# Patient Record
Sex: FEMALE | Race: WHITE | Employment: OTHER | URBAN - METROPOLITAN AREA
[De-identification: names, ages, dates, MRNs, and addresses within clinical notes are randomized per-mention and may not be internally consistent; named-entity substitution may affect disease eponyms.]

---

## 2017-09-08 ENCOUNTER — HOSPITAL ENCOUNTER (OUTPATIENT)
Dept: MAMMOGRAPHY | Age: 67
Discharge: HOME | End: 2017-09-08

## 2017-09-08 DIAGNOSIS — Z12.31 ENCOUNTER FOR SCREENING MAMMOGRAM FOR BREAST CANCER: ICD-10-CM

## 2017-09-08 DIAGNOSIS — Z13.820 ENCOUNTER FOR IMAGING TO ASSESS OSTEOPOROSIS: ICD-10-CM

## 2019-09-04 ENCOUNTER — HOSPITAL ENCOUNTER (OUTPATIENT)
Dept: ULTRASOUND IMAGING | Age: 69
Discharge: HOME | End: 2019-09-04
Payer: MEDICARE

## 2019-09-04 DIAGNOSIS — K74.60 CIRRHOSIS OF LIVER WITHOUT ASCITES, UNSPECIFIED HEPATIC CIRRHOSIS TYPE (HCC): ICD-10-CM

## 2019-09-04 PROCEDURE — 76705 ECHO EXAM OF ABDOMEN: CPT

## 2019-09-17 ENCOUNTER — HOSPITAL ENCOUNTER (OUTPATIENT)
Dept: CT IMAGING | Age: 69
Discharge: HOME | End: 2019-09-17
Payer: MEDICARE

## 2019-09-17 ENCOUNTER — HOSPITAL ENCOUNTER (OUTPATIENT)
Age: 69
Discharge: HOME | End: 2019-09-17
Payer: MEDICARE

## 2019-09-17 DIAGNOSIS — R93.3 ABNORMAL FINDINGS ON RADIOLOGICAL EXAMINATION OF GASTROINTESTINAL TRACT: ICD-10-CM

## 2019-09-17 LAB
CREAT SERPL-MCNC: 0.7 MG/DL (ref 0.6–1.2)
GFR AFRICAN AMERICAN: >60
GFR NON-AFRICAN AMERICAN: >60

## 2019-09-17 PROCEDURE — 74170 CT ABD WO CNTRST FLWD CNTRST: CPT

## 2019-09-17 PROCEDURE — 36415 COLL VENOUS BLD VENIPUNCTURE: CPT

## 2019-09-17 PROCEDURE — 6360000004 HC RX CONTRAST MEDICATION: Performed by: INTERNAL MEDICINE

## 2019-09-17 PROCEDURE — 82565 ASSAY OF CREATININE: CPT

## 2019-09-17 RX ADMIN — IOPAMIDOL 75 ML: 755 INJECTION, SOLUTION INTRAVENOUS at 15:24

## 2022-05-02 ENCOUNTER — HOSPITAL ENCOUNTER (OUTPATIENT)
Dept: ULTRASOUND IMAGING | Age: 72
Discharge: HOME | End: 2022-05-02
Payer: MEDICARE

## 2022-05-02 DIAGNOSIS — K74.60 HEPATIC CIRRHOSIS, UNSPECIFIED HEPATIC CIRRHOSIS TYPE, UNSPECIFIED WHETHER ASCITES PRESENT (HCC): ICD-10-CM

## 2022-05-02 PROCEDURE — 76705 ECHO EXAM OF ABDOMEN: CPT

## 2022-11-28 ENCOUNTER — HOSPITAL ENCOUNTER (OUTPATIENT)
Dept: ULTRASOUND IMAGING | Age: 72
Discharge: HOME | End: 2022-11-28
Payer: MEDICARE

## 2022-11-28 DIAGNOSIS — K74.60 HEPATIC CIRRHOSIS, UNSPECIFIED HEPATIC CIRRHOSIS TYPE, UNSPECIFIED WHETHER ASCITES PRESENT (HCC): ICD-10-CM

## 2022-11-28 PROCEDURE — 76705 ECHO EXAM OF ABDOMEN: CPT

## 2023-05-15 ENCOUNTER — TRANSCRIBE ORDERS (OUTPATIENT)
Dept: ADMINISTRATIVE | Age: 73
End: 2023-05-15

## 2023-05-15 DIAGNOSIS — K74.69 OTHER CIRRHOSIS OF LIVER (HCC): Primary | ICD-10-CM

## 2023-05-22 ENCOUNTER — HOSPITAL ENCOUNTER (OUTPATIENT)
Dept: ULTRASOUND IMAGING | Age: 73
Discharge: HOME | End: 2023-05-22
Payer: MEDICARE

## 2023-05-22 DIAGNOSIS — K74.69 OTHER CIRRHOSIS OF LIVER (HCC): ICD-10-CM

## 2023-05-22 PROCEDURE — 76705 ECHO EXAM OF ABDOMEN: CPT

## 2023-12-04 ENCOUNTER — HOSPITAL ENCOUNTER (OUTPATIENT)
Dept: ULTRASOUND IMAGING | Age: 73
Discharge: HOME | End: 2023-12-04
Attending: INTERNAL MEDICINE
Payer: MEDICARE

## 2023-12-04 DIAGNOSIS — K74.60 HEPATIC CIRRHOSIS, UNSPECIFIED HEPATIC CIRRHOSIS TYPE, UNSPECIFIED WHETHER ASCITES PRESENT (HCC): ICD-10-CM

## 2023-12-04 PROCEDURE — 76705 ECHO EXAM OF ABDOMEN: CPT

## 2024-01-03 ENCOUNTER — HOSPITAL ENCOUNTER (OUTPATIENT)
Dept: MRI IMAGING | Age: 74
Discharge: HOME | End: 2024-01-03
Attending: INTERNAL MEDICINE
Payer: MEDICARE

## 2024-01-03 DIAGNOSIS — K74.69 OTHER CIRRHOSIS OF LIVER (HCC): ICD-10-CM

## 2024-01-03 PROCEDURE — 74183 MRI ABD W/O CNTR FLWD CNTR: CPT

## 2024-01-03 PROCEDURE — A9579 GAD-BASE MR CONTRAST NOS,1ML: HCPCS | Performed by: INTERNAL MEDICINE

## 2024-01-03 PROCEDURE — 6360000004 HC RX CONTRAST MEDICATION: Performed by: INTERNAL MEDICINE

## 2024-01-03 RX ADMIN — GADOTERIDOL 15 ML: 279.3 INJECTION, SOLUTION INTRAVENOUS at 11:32

## 2025-05-28 ENCOUNTER — APPOINTMENT (OUTPATIENT)
Dept: CT IMAGING | Age: 75
DRG: 871 | End: 2025-05-28
Attending: STUDENT IN AN ORGANIZED HEALTH CARE EDUCATION/TRAINING PROGRAM
Payer: MEDICARE

## 2025-05-28 ENCOUNTER — APPOINTMENT (OUTPATIENT)
Dept: GENERAL RADIOLOGY | Age: 75
DRG: 871 | End: 2025-05-28
Payer: MEDICARE

## 2025-05-28 ENCOUNTER — APPOINTMENT (OUTPATIENT)
Dept: CT IMAGING | Age: 75
DRG: 871 | End: 2025-05-28
Payer: MEDICARE

## 2025-05-28 ENCOUNTER — HOSPITAL ENCOUNTER (INPATIENT)
Age: 75
LOS: 7 days | Discharge: NURSING, REHAB OR LONG-TERM CARE | DRG: 871 | End: 2025-06-04
Attending: STUDENT IN AN ORGANIZED HEALTH CARE EDUCATION/TRAINING PROGRAM
Payer: MEDICARE

## 2025-05-28 DIAGNOSIS — A41.9 SEPTICEMIA (HCC): Primary | ICD-10-CM

## 2025-05-28 DIAGNOSIS — K76.82 HEPATIC ENCEPHALOPATHY (HCC): ICD-10-CM

## 2025-05-28 DIAGNOSIS — D69.6 THROMBOCYTOPENIA: ICD-10-CM

## 2025-05-28 DIAGNOSIS — R11.2 NAUSEA AND VOMITING, UNSPECIFIED VOMITING TYPE: ICD-10-CM

## 2025-05-28 PROBLEM — R06.02 SOB (SHORTNESS OF BREATH): Status: ACTIVE | Noted: 2025-05-28

## 2025-05-28 LAB
ALBUMIN SERPL-MCNC: 3.9 G/DL (ref 3.4–5)
ALBUMIN/GLOB SERPL: 1.6 {RATIO} (ref 1.1–2.2)
ALP SERPL-CCNC: 69 U/L (ref 40–129)
ALT SERPL-CCNC: 13 U/L (ref 10–40)
AMMONIA PLAS-SCNC: 88 UMOL/L (ref 11–51)
ANION GAP SERPL CALCULATED.3IONS-SCNC: 10 MMOL/L (ref 3–16)
AST SERPL-CCNC: 33 U/L (ref 15–37)
BASE EXCESS BLDV CALC-SCNC: -3.8 MMOL/L (ref -3–3)
BASOPHILS # BLD: 0 K/UL (ref 0–0.2)
BASOPHILS NFR BLD: 0 %
BILIRUB SERPL-MCNC: 0.8 MG/DL (ref 0–1)
BILIRUB UR QL STRIP.AUTO: NEGATIVE
BUN SERPL-MCNC: 13 MG/DL (ref 7–20)
CALCIUM SERPL-MCNC: 8.7 MG/DL (ref 8.3–10.6)
CHLORIDE SERPL-SCNC: 108 MMOL/L (ref 99–110)
CLARITY UR: CLEAR
CO2 BLDV-SCNC: 21 MMOL/L
CO2 SERPL-SCNC: 19 MMOL/L (ref 21–32)
COHGB MFR BLDV: 1.5 % (ref 0–1.5)
COLOR UR: YELLOW
CREAT SERPL-MCNC: 0.8 MG/DL (ref 0.6–1.2)
DEPRECATED RDW RBC AUTO: 12.6 % (ref 12.4–15.4)
EOSINOPHIL # BLD: 0 K/UL (ref 0–0.6)
EOSINOPHIL NFR BLD: 0 %
EPI CELLS #/AREA URNS HPF: NORMAL /HPF (ref 0–5)
FLUAV RNA RESP QL NAA+PROBE: NOT DETECTED
FLUBV RNA RESP QL NAA+PROBE: NOT DETECTED
GFR SERPLBLD CREATININE-BSD FMLA CKD-EPI: 76 ML/MIN/{1.73_M2}
GLUCOSE SERPL-MCNC: 105 MG/DL (ref 70–99)
GLUCOSE UR STRIP.AUTO-MCNC: NEGATIVE MG/DL
HCO3 BLDV-SCNC: 19.7 MMOL/L (ref 23–29)
HCT VFR BLD AUTO: 41.6 % (ref 36–48)
HGB BLD-MCNC: 14.4 G/DL (ref 12–16)
HGB UR QL STRIP.AUTO: ABNORMAL
INR PPP: 1.38 (ref 0.85–1.15)
KETONES UR STRIP.AUTO-MCNC: NEGATIVE MG/DL
LACTATE BLDV-SCNC: 2 MMOL/L (ref 0.4–2)
LACTATE BLDV-SCNC: 2.2 MMOL/L (ref 0.4–1.9)
LACTATE BLDV-SCNC: 2.7 MMOL/L (ref 0.4–1.9)
LACTATE BLDV-SCNC: 3.1 MMOL/L (ref 0.4–2)
LEUKOCYTE ESTERASE UR QL STRIP.AUTO: NEGATIVE
LIPASE SERPL-CCNC: 36 U/L (ref 13–60)
LYMPHOCYTES # BLD: 0.3 K/UL (ref 1–5.1)
LYMPHOCYTES NFR BLD: 5 %
MACROCYTES BLD QL SMEAR: ABNORMAL
MCH RBC QN AUTO: 32.6 PG (ref 26–34)
MCHC RBC AUTO-ENTMCNC: 34.6 G/DL (ref 31–36)
MCV RBC AUTO: 94.2 FL (ref 80–100)
METHGB MFR BLDV: 0.2 %
MICROCYTES BLD QL SMEAR: ABNORMAL
MONOCYTES # BLD: 0.6 K/UL (ref 0–1.3)
MONOCYTES NFR BLD: 12 %
NEUTROPHILS # BLD: 4.2 K/UL (ref 1.7–7.7)
NEUTROPHILS NFR BLD: 77 %
NEUTS BAND NFR BLD MANUAL: 6 % (ref 0–7)
NITRITE UR QL STRIP.AUTO: NEGATIVE
NT-PROBNP SERPL-MCNC: 418 PG/ML (ref 0–449)
O2 THERAPY: ABNORMAL
OVALOCYTES BLD QL SMEAR: ABNORMAL
PCO2 BLDV: 31.6 MMHG (ref 40–50)
PH BLDV: 7.41 [PH] (ref 7.35–7.45)
PH UR STRIP.AUTO: 7 [PH] (ref 5–8)
PLATELET # BLD AUTO: 127 K/UL (ref 135–450)
PLATELET BLD QL SMEAR: ABNORMAL
PMV BLD AUTO: 8.3 FL (ref 5–10.5)
PO2 BLDV: 46.9 MMHG (ref 25–40)
POTASSIUM SERPL-SCNC: 4.2 MMOL/L (ref 3.5–5.1)
PROCALCITONIN SERPL IA-MCNC: 0.09 NG/ML (ref 0–0.15)
PROT SERPL-MCNC: 6.3 G/DL (ref 6.4–8.2)
PROT UR STRIP.AUTO-MCNC: NEGATIVE MG/DL
PROTHROMBIN TIME: 17.1 SEC (ref 11.9–14.9)
RBC # BLD AUTO: 4.42 M/UL (ref 4–5.2)
RBC #/AREA URNS HPF: NORMAL /HPF (ref 0–4)
SAO2 % BLDV: 82 %
SARS-COV-2 RNA RESP QL NAA+PROBE: NOT DETECTED
SLIDE REVIEW: ABNORMAL
SODIUM SERPL-SCNC: 137 MMOL/L (ref 136–145)
SP GR UR STRIP.AUTO: 1.01 (ref 1–1.03)
TROPONIN, HIGH SENSITIVITY: 9 NG/L (ref 0–14)
UA COMPLETE W REFLEX CULTURE PNL UR: ABNORMAL
UA DIPSTICK W REFLEX MICRO PNL UR: YES
URN SPEC COLLECT METH UR: ABNORMAL
UROBILINOGEN UR STRIP-ACNC: 0.2 E.U./DL
WBC # BLD AUTO: 5.1 K/UL (ref 4–11)
WBC #/AREA URNS HPF: NORMAL /HPF (ref 0–5)

## 2025-05-28 PROCEDURE — 6370000000 HC RX 637 (ALT 250 FOR IP)

## 2025-05-28 PROCEDURE — 6360000002 HC RX W HCPCS: Performed by: STUDENT IN AN ORGANIZED HEALTH CARE EDUCATION/TRAINING PROGRAM

## 2025-05-28 PROCEDURE — 96367 TX/PROPH/DG ADDL SEQ IV INF: CPT

## 2025-05-28 PROCEDURE — 6360000002 HC RX W HCPCS: Performed by: NURSE PRACTITIONER

## 2025-05-28 PROCEDURE — 36415 COLL VENOUS BLD VENIPUNCTURE: CPT

## 2025-05-28 PROCEDURE — 80307 DRUG TEST PRSMV CHEM ANLYZR: CPT

## 2025-05-28 PROCEDURE — 83880 ASSAY OF NATRIURETIC PEPTIDE: CPT

## 2025-05-28 PROCEDURE — 6370000000 HC RX 637 (ALT 250 FOR IP): Performed by: STUDENT IN AN ORGANIZED HEALTH CARE EDUCATION/TRAINING PROGRAM

## 2025-05-28 PROCEDURE — 87040 BLOOD CULTURE FOR BACTERIA: CPT

## 2025-05-28 PROCEDURE — 85610 PROTHROMBIN TIME: CPT

## 2025-05-28 PROCEDURE — 87636 SARSCOV2 & INF A&B AMP PRB: CPT

## 2025-05-28 PROCEDURE — 2500000003 HC RX 250 WO HCPCS

## 2025-05-28 PROCEDURE — 99285 EMERGENCY DEPT VISIT HI MDM: CPT

## 2025-05-28 PROCEDURE — 6360000004 HC RX CONTRAST MEDICATION: Performed by: STUDENT IN AN ORGANIZED HEALTH CARE EDUCATION/TRAINING PROGRAM

## 2025-05-28 PROCEDURE — 80053 COMPREHEN METABOLIC PANEL: CPT

## 2025-05-28 PROCEDURE — 83690 ASSAY OF LIPASE: CPT

## 2025-05-28 PROCEDURE — 74177 CT ABD & PELVIS W/CONTRAST: CPT

## 2025-05-28 PROCEDURE — 96375 TX/PRO/DX INJ NEW DRUG ADDON: CPT

## 2025-05-28 PROCEDURE — 96365 THER/PROPH/DIAG IV INF INIT: CPT

## 2025-05-28 PROCEDURE — 85025 COMPLETE CBC W/AUTO DIFF WBC: CPT

## 2025-05-28 PROCEDURE — 2580000003 HC RX 258: Performed by: STUDENT IN AN ORGANIZED HEALTH CARE EDUCATION/TRAINING PROGRAM

## 2025-05-28 PROCEDURE — 1200000000 HC SEMI PRIVATE

## 2025-05-28 PROCEDURE — 6360000002 HC RX W HCPCS

## 2025-05-28 PROCEDURE — 84145 PROCALCITONIN (PCT): CPT

## 2025-05-28 PROCEDURE — 81001 URINALYSIS AUTO W/SCOPE: CPT

## 2025-05-28 PROCEDURE — 2580000003 HC RX 258

## 2025-05-28 PROCEDURE — P9612 CATHETERIZE FOR URINE SPEC: HCPCS

## 2025-05-28 PROCEDURE — 84484 ASSAY OF TROPONIN QUANT: CPT

## 2025-05-28 PROCEDURE — 96366 THER/PROPH/DIAG IV INF ADDON: CPT

## 2025-05-28 PROCEDURE — 83605 ASSAY OF LACTIC ACID: CPT

## 2025-05-28 PROCEDURE — 82140 ASSAY OF AMMONIA: CPT

## 2025-05-28 PROCEDURE — 82105 ALPHA-FETOPROTEIN SERUM: CPT

## 2025-05-28 PROCEDURE — 70450 CT HEAD/BRAIN W/O DYE: CPT

## 2025-05-28 PROCEDURE — 71260 CT THORAX DX C+: CPT

## 2025-05-28 PROCEDURE — 6370000000 HC RX 637 (ALT 250 FOR IP): Performed by: NURSE PRACTITIONER

## 2025-05-28 PROCEDURE — 71045 X-RAY EXAM CHEST 1 VIEW: CPT

## 2025-05-28 PROCEDURE — 82803 BLOOD GASES ANY COMBINATION: CPT

## 2025-05-28 RX ORDER — KETOROLAC TROMETHAMINE 30 MG/ML
15 INJECTION, SOLUTION INTRAMUSCULAR; INTRAVENOUS ONCE
Status: COMPLETED | OUTPATIENT
Start: 2025-05-28 | End: 2025-05-28

## 2025-05-28 RX ORDER — GUAIFENESIN/DEXTROMETHORPHAN 100-10MG/5
5 SYRUP ORAL EVERY 4 HOURS PRN
Status: DISCONTINUED | OUTPATIENT
Start: 2025-05-28 | End: 2025-06-04 | Stop reason: HOSPADM

## 2025-05-28 RX ORDER — IOPAMIDOL 755 MG/ML
75 INJECTION, SOLUTION INTRAVASCULAR
Status: COMPLETED | OUTPATIENT
Start: 2025-05-28 | End: 2025-05-28

## 2025-05-28 RX ORDER — BUPROPION HYDROCHLORIDE 150 MG/1
150 TABLET ORAL EVERY MORNING
COMMUNITY
Start: 2025-04-02

## 2025-05-28 RX ORDER — LACTULOSE 10 G/15ML
20 SOLUTION ORAL 3 TIMES DAILY
Status: DISCONTINUED | OUTPATIENT
Start: 2025-05-28 | End: 2025-06-04 | Stop reason: HOSPADM

## 2025-05-28 RX ORDER — ALBUTEROL SULFATE 0.83 MG/ML
2.5 SOLUTION RESPIRATORY (INHALATION) EVERY 4 HOURS PRN
Status: DISCONTINUED | OUTPATIENT
Start: 2025-05-28 | End: 2025-06-04 | Stop reason: HOSPADM

## 2025-05-28 RX ORDER — FOLIC ACID 0.4 MG
400 TABLET ORAL DAILY
Status: ON HOLD | COMMUNITY
End: 2025-06-04 | Stop reason: HOSPADM

## 2025-05-28 RX ORDER — PANTOPRAZOLE SODIUM 40 MG/10ML
40 INJECTION, POWDER, LYOPHILIZED, FOR SOLUTION INTRAVENOUS ONCE
Status: COMPLETED | OUTPATIENT
Start: 2025-05-28 | End: 2025-05-28

## 2025-05-28 RX ORDER — DESVENLAFAXINE 50 MG/1
50 TABLET, FILM COATED, EXTENDED RELEASE ORAL DAILY
Status: ON HOLD | COMMUNITY
End: 2025-06-04 | Stop reason: HOSPADM

## 2025-05-28 RX ORDER — 0.9 % SODIUM CHLORIDE 0.9 %
1000 INTRAVENOUS SOLUTION INTRAVENOUS ONCE
Status: COMPLETED | OUTPATIENT
Start: 2025-05-28 | End: 2025-05-28

## 2025-05-28 RX ORDER — SODIUM CHLORIDE 9 MG/ML
INJECTION, SOLUTION INTRAVENOUS PRN
Status: DISCONTINUED | OUTPATIENT
Start: 2025-05-28 | End: 2025-06-04 | Stop reason: HOSPADM

## 2025-05-28 RX ORDER — LACTULOSE 10 G/10G
10 SOLUTION ORAL DAILY
Status: ON HOLD | COMMUNITY
End: 2025-06-04 | Stop reason: HOSPADM

## 2025-05-28 RX ORDER — ACETAMINOPHEN 325 MG/1
650 TABLET ORAL EVERY 6 HOURS PRN
Status: DISCONTINUED | OUTPATIENT
Start: 2025-05-28 | End: 2025-06-04 | Stop reason: HOSPADM

## 2025-05-28 RX ORDER — MAGNESIUM SULFATE IN WATER 40 MG/ML
2000 INJECTION, SOLUTION INTRAVENOUS PRN
Status: DISCONTINUED | OUTPATIENT
Start: 2025-05-28 | End: 2025-06-04 | Stop reason: HOSPADM

## 2025-05-28 RX ORDER — ONDANSETRON 4 MG/1
4 TABLET, ORALLY DISINTEGRATING ORAL EVERY 8 HOURS PRN
Status: DISCONTINUED | OUTPATIENT
Start: 2025-05-28 | End: 2025-06-04 | Stop reason: HOSPADM

## 2025-05-28 RX ORDER — ACETAMINOPHEN 325 MG/1
650 TABLET ORAL ONCE
Status: DISCONTINUED | OUTPATIENT
Start: 2025-05-28 | End: 2025-06-02 | Stop reason: ALTCHOICE

## 2025-05-28 RX ORDER — POLYETHYLENE GLYCOL 3350 17 G/17G
17 POWDER, FOR SOLUTION ORAL DAILY PRN
Status: DISCONTINUED | OUTPATIENT
Start: 2025-05-28 | End: 2025-06-04 | Stop reason: HOSPADM

## 2025-05-28 RX ORDER — SODIUM CHLORIDE, SODIUM LACTATE, POTASSIUM CHLORIDE, CALCIUM CHLORIDE 600; 310; 30; 20 MG/100ML; MG/100ML; MG/100ML; MG/100ML
INJECTION, SOLUTION INTRAVENOUS CONTINUOUS
Status: DISCONTINUED | OUTPATIENT
Start: 2025-05-28 | End: 2025-06-04 | Stop reason: HOSPADM

## 2025-05-28 RX ORDER — POTASSIUM CHLORIDE 1500 MG/1
40 TABLET, EXTENDED RELEASE ORAL PRN
Status: DISCONTINUED | OUTPATIENT
Start: 2025-05-28 | End: 2025-05-31

## 2025-05-28 RX ORDER — RAMELTEON 8 MG/1
TABLET ORAL
Status: ON HOLD | COMMUNITY
Start: 2025-03-01 | End: 2025-06-04 | Stop reason: HOSPADM

## 2025-05-28 RX ORDER — SODIUM CHLORIDE 0.9 % (FLUSH) 0.9 %
5-40 SYRINGE (ML) INJECTION EVERY 12 HOURS SCHEDULED
Status: DISCONTINUED | OUTPATIENT
Start: 2025-05-28 | End: 2025-06-04 | Stop reason: HOSPADM

## 2025-05-28 RX ORDER — VANCOMYCIN 1.25 G/250ML
25 INJECTION, SOLUTION INTRAVENOUS ONCE
Status: COMPLETED | OUTPATIENT
Start: 2025-05-28 | End: 2025-05-28

## 2025-05-28 RX ORDER — HYDROXYZINE HYDROCHLORIDE 25 MG/1
TABLET, FILM COATED ORAL
Status: ON HOLD | COMMUNITY
End: 2025-06-04 | Stop reason: HOSPADM

## 2025-05-28 RX ORDER — ACETAMINOPHEN 650 MG/1
650 SUPPOSITORY RECTAL EVERY 6 HOURS PRN
Status: DISCONTINUED | OUTPATIENT
Start: 2025-05-28 | End: 2025-06-04 | Stop reason: HOSPADM

## 2025-05-28 RX ORDER — ONDANSETRON 2 MG/ML
4 INJECTION INTRAMUSCULAR; INTRAVENOUS ONCE
Status: COMPLETED | OUTPATIENT
Start: 2025-05-28 | End: 2025-05-28

## 2025-05-28 RX ORDER — BUPROPION HYDROCHLORIDE 150 MG/1
150 TABLET ORAL DAILY
Status: DISCONTINUED | OUTPATIENT
Start: 2025-05-29 | End: 2025-06-04 | Stop reason: HOSPADM

## 2025-05-28 RX ORDER — POTASSIUM CHLORIDE 7.45 MG/ML
10 INJECTION INTRAVENOUS PRN
Status: DISCONTINUED | OUTPATIENT
Start: 2025-05-28 | End: 2025-05-31

## 2025-05-28 RX ORDER — SODIUM CHLORIDE 0.9 % (FLUSH) 0.9 %
5-40 SYRINGE (ML) INJECTION PRN
Status: DISCONTINUED | OUTPATIENT
Start: 2025-05-28 | End: 2025-06-04 | Stop reason: HOSPADM

## 2025-05-28 RX ORDER — PROCHLORPERAZINE EDISYLATE 5 MG/ML
10 INJECTION INTRAMUSCULAR; INTRAVENOUS EVERY 6 HOURS PRN
Status: DISCONTINUED | OUTPATIENT
Start: 2025-05-28 | End: 2025-06-04 | Stop reason: HOSPADM

## 2025-05-28 RX ORDER — TRAZODONE HYDROCHLORIDE 100 MG/1
TABLET ORAL
Status: ON HOLD | COMMUNITY
End: 2025-06-04 | Stop reason: HOSPADM

## 2025-05-28 RX ORDER — NARATRIPTAN 2.5 MG/1
TABLET ORAL
Status: ON HOLD | COMMUNITY
End: 2025-06-04 | Stop reason: HOSPADM

## 2025-05-28 RX ORDER — PROPRANOLOL HYDROCHLORIDE 60 MG/1
60 TABLET ORAL DAILY
Status: ON HOLD | COMMUNITY
End: 2025-06-04 | Stop reason: HOSPADM

## 2025-05-28 RX ORDER — LITHIUM CARBONATE 300 MG/1
CAPSULE ORAL
Status: ON HOLD | COMMUNITY
End: 2025-06-04 | Stop reason: HOSPADM

## 2025-05-28 RX ORDER — ENOXAPARIN SODIUM 100 MG/ML
40 INJECTION SUBCUTANEOUS DAILY
Status: DISCONTINUED | OUTPATIENT
Start: 2025-05-28 | End: 2025-06-04 | Stop reason: HOSPADM

## 2025-05-28 RX ORDER — LACTULOSE 10 G/15ML
20 SOLUTION ORAL DAILY
Status: DISCONTINUED | OUTPATIENT
Start: 2025-05-28 | End: 2025-05-28

## 2025-05-28 RX ORDER — PANTOPRAZOLE SODIUM 40 MG/1
40 TABLET, DELAYED RELEASE ORAL
Status: DISCONTINUED | OUTPATIENT
Start: 2025-05-29 | End: 2025-06-04 | Stop reason: HOSPADM

## 2025-05-28 RX ORDER — ONDANSETRON 2 MG/ML
4 INJECTION INTRAMUSCULAR; INTRAVENOUS EVERY 6 HOURS PRN
Status: DISCONTINUED | OUTPATIENT
Start: 2025-05-28 | End: 2025-06-04 | Stop reason: HOSPADM

## 2025-05-28 RX ADMIN — PROCHLORPERAZINE EDISYLATE 10 MG: 5 INJECTION INTRAMUSCULAR; INTRAVENOUS at 21:52

## 2025-05-28 RX ADMIN — SODIUM CHLORIDE, PRESERVATIVE FREE 5 ML: 5 INJECTION INTRAVENOUS at 20:39

## 2025-05-28 RX ADMIN — SODIUM CHLORIDE, SODIUM LACTATE, POTASSIUM CHLORIDE, AND CALCIUM CHLORIDE: .6; .31; .03; .02 INJECTION, SOLUTION INTRAVENOUS at 13:32

## 2025-05-28 RX ADMIN — SODIUM CHLORIDE, SODIUM LACTATE, POTASSIUM CHLORIDE, AND CALCIUM CHLORIDE: .6; .31; .03; .02 INJECTION, SOLUTION INTRAVENOUS at 22:35

## 2025-05-28 RX ADMIN — RIFAXIMIN 400 MG: 200 TABLET ORAL at 20:25

## 2025-05-28 RX ADMIN — PANTOPRAZOLE SODIUM 40 MG: 40 INJECTION, POWDER, FOR SOLUTION INTRAVENOUS at 06:46

## 2025-05-28 RX ADMIN — ENOXAPARIN SODIUM 40 MG: 100 INJECTION SUBCUTANEOUS at 11:17

## 2025-05-28 RX ADMIN — CEFEPIME 1000 MG: 1 INJECTION, POWDER, FOR SOLUTION INTRAMUSCULAR; INTRAVENOUS at 08:22

## 2025-05-28 RX ADMIN — ONDANSETRON 4 MG: 2 INJECTION, SOLUTION INTRAMUSCULAR; INTRAVENOUS at 06:45

## 2025-05-28 RX ADMIN — LACTULOSE 20 G: 20 SOLUTION ORAL at 20:25

## 2025-05-28 RX ADMIN — IOPAMIDOL 75 ML: 755 INJECTION, SOLUTION INTRAVENOUS at 07:52

## 2025-05-28 RX ADMIN — WATER 2000 MG: 1 INJECTION INTRAMUSCULAR; INTRAVENOUS; SUBCUTANEOUS at 15:51

## 2025-05-28 RX ADMIN — ONDANSETRON 4 MG: 2 INJECTION, SOLUTION INTRAMUSCULAR; INTRAVENOUS at 13:33

## 2025-05-28 RX ADMIN — KETOROLAC TROMETHAMINE 15 MG: 30 INJECTION, SOLUTION INTRAMUSCULAR at 07:14

## 2025-05-28 RX ADMIN — PROPRANOLOL HYDROCHLORIDE 60 MG: 40 TABLET ORAL at 15:44

## 2025-05-28 RX ADMIN — VANCOMYCIN 1750 MG: 1.25 INJECTION, SOLUTION INTRAVENOUS at 09:04

## 2025-05-28 RX ADMIN — SODIUM CHLORIDE, PRESERVATIVE FREE 10 ML: 5 INJECTION INTRAVENOUS at 11:17

## 2025-05-28 RX ADMIN — RIFAXIMIN 400 MG: 200 TABLET ORAL at 15:43

## 2025-05-28 RX ADMIN — SODIUM CHLORIDE 1000 ML: 0.9 INJECTION, SOLUTION INTRAVENOUS at 06:45

## 2025-05-28 RX ADMIN — ONDANSETRON 4 MG: 2 INJECTION, SOLUTION INTRAMUSCULAR; INTRAVENOUS at 21:21

## 2025-05-28 RX ADMIN — LACTULOSE 20 G: 20 SOLUTION ORAL at 11:18

## 2025-05-28 ASSESSMENT — PAIN - FUNCTIONAL ASSESSMENT: PAIN_FUNCTIONAL_ASSESSMENT: 0-10

## 2025-05-28 ASSESSMENT — PAIN SCALES - GENERAL: PAINLEVEL_OUTOF10: 0

## 2025-05-28 NOTE — ED NOTES
Patient identified as a positive fall risk on the ED triage fall screening.  Patient placed in fall precautions which includes:  yellow fall risk bracelet on wrist and yellow socks on feet. Bed alarm in place.  Patient instructed on importance of not getting out of bed or ambulating without assistance for safety.  Pt verbalized understanding.

## 2025-05-28 NOTE — CONSULTS
Consult Call Back    Who:Taylor Vazquez, VIRGIL - CNP   Date:5/28/2025,  Time:3:05 PM    Electronically signed by Joy Sloan on 5/28/25 at 3:05 PM EDT

## 2025-05-28 NOTE — CONSULTS
Pharmacy Note  Vancomycin Consult for ED       Consult received from Dr. Brown to dose Vancomycin x1 for treatment of Sepsis.     Allergies:  Codeine      Tmax: 102.1 F    Recent Labs     05/28/25  0636   CREATININE 0.8     Estimated Creatinine Clearance: 57 mL/min (based on SCr of 0.8 mg/dL).    Recent Labs     05/28/25  0636   WBC 5.1       Wt Readings from Last 1 Encounters:   05/28/25 73.5 kg (162 lb)         Vancomycin 1750 mg (~ 25 mg/kg) IV once x1 ordered.    Please re-consult if admitted & would like Vancomycin to continue.      Thank you for the consult.   Sudha Vega, PharmD 5/28/2025 8:48 AM

## 2025-05-28 NOTE — PLAN OF CARE
Hospitalist group paged for admission.  Sent to Dr. Manuel for admission    Armando Reece MD  Hospitalist

## 2025-05-28 NOTE — PLAN OF CARE
Problem: Safety - Adult  Goal: Free from fall injury  5/28/2025 1935 by Marilou Fowler, RN  Outcome: Progressing  5/28/2025 1507 by Napoleon Cisneros, RN  Outcome: Progressing     Problem: Skin/Tissue Integrity  Goal: Skin integrity remains intact  Description: 1.  Monitor for areas of redness and/or skin breakdown2.  Assess vascular access sites hourly3.  Every 4-6 hours minimum:  Change oxygen saturation probe site4.  Every 4-6 hours:  If on nasal continuous positive airway pressure, respiratory therapy assess nares and determine need for appliance change or resting period  5/28/2025 1935 by Marilou Fowler, RN  Outcome: Progressing  5/28/2025 1507 by Napoleon Cisneros, RN  Outcome: Progressing

## 2025-05-28 NOTE — H&P
[facility] Medicine History & Physical    V 5.1    Date of Admission: 5/28/2025    Date of Service:  Pt seen/examined on 5/28/25     [x]Admitted to Inpatient with expected LOS greater than two midnights due to medical therapy.  []Placed in Observation status.    Chief Admission Complaint:  Nausea, vomiting, weakness, couldn't walk    Presenting Admission History:      74 y.o. female who presented to [facility] with Nausea, vomiting, weakness, couldn't walk.  PMHx significant for Anxiety, cirrhosis, pHTN. Patient seen at bedside this morning. Patient mentions ongoing nausea, vomiting, weakness and trouble walking ongoing for past day. Patient also mentions ongoing intermittent fevers with dry cough. Describes abdominal pain 5/10 in severity in middle of abdomen. Patient has some trouble with vision. No hematochezia, hematemesis, pain anywhere else, sob, hypoxic events. Patient at home was able to complete ADLs independently.      Assessment/Plan:        #Sepsis, severe  #Cirrhosis hx  #Portal HTN hx  #Anxiety hx  #Nausea, vomiting  #Weakness  SIRS 2/4: HR, Fevers, lactic acid  Plan:  Ceftriaxone and vancomycin in place  Cultures ordered, continue to follow  Continue home bupropion, rifaximin and propranolol  GI consulted, appreciate recommendations  Maintain oxygen saturation  IVF as needed  Trend lactic acid  PPI in place  PT/OT    Discussed management and the need for Hospitalization of the patient w/ the Emergency Department Provider: Sepsis, cirrhosis    CXR: I have reviewed the CXR with the following interpretation: Congestion, Possible CAP  EKG:  I have reviewed the EKG with the following interpretation: Normal sinus    Physical Exam Performed:      General appearance:  No apparent distress, appears stated age and cooperative.  HEENT:  Pupils equal, round, and reactive to light.   Respiratory:  Normal respiratory effort. Clear to auscultation bilaterally without  Rales/Wheezes/Rhonchi.  Cardiovascular:  Regular rate and rhythm with normal S1/S2 without murmurs, rubs or gallops.  Abdomen:  Soft, non-tender, non-distended with normal bowel sounds.  Musculoskeletal:  No clubbing, cyanosis or edema bilaterally.  Full range of motion without deformity.  Neurologic:  Neurovascularly intact without any focal sensory/motor deficits.  Psychiatric:  Alert and oriented, thought content appropriate, normal insight  Skin:  Skin color, texture, turgor normal.  No rashes or lesions.  Capillary Refill:  Brisk,< 3 seconds   Peripheral Pulses:  +2 palpable, equal bilaterally     BP (!) 114/59   Pulse 72   Temp 98.2 °F (36.8 °C) (Oral)   Resp 16   Ht 1.6 m (5' 3\")   Wt 73.5 kg (162 lb)   SpO2 96%   BMI 28.70 kg/m²     Diet: [x]Adult/General  [x]Cardiac  []Diabetic  [x]Low Fat  []NPO  []NPO after Midnight  []Other     DVT Prophylaxis: PPX dose LMWH    Code status: []Full  []DNR/CCA  []Limited (DNR/CCA with Do Not Intubate)  []DNRCC    Surrogate Decision Maker:   Name Home Phone Work Phone Mobile Phone Relationship Lgl GrBHASKAR Posada DR  [phone] Spouse    GUY CHERRY [phone]   Step Child         PT/OT Eval Status: Ordered and pending    Anticipated Discharge Day/Date:  [facility]    Anticipated Discharge Location: Home w/ [facility]    Consults:      PHARMACY TO DOSE VANCOMYCIN  IP CONSULT TO HOSPITALIST  IP CONSULT TO CASE MANAGEMENT  IP CONSULT TO GI    I personally have obtained, updated and/or reviewed the patient’s medication list on 5/28/2025   --------------------------------------------------------------------------------------------------------------------------------------------------------------------    Imaging:     CT ABDOMEN PELVIS W IV CONTRAST Additional Contrast? None  Result Date: 5/28/2025  CT PULMONARY ANGIOGRAPHY OF THE CHEST History: Concern for Pulmonary Embolism. Chest Pain. Shortness of Breath. PROCEDURE: 100 ml of Omnipaque 350 contrast

## 2025-05-28 NOTE — CONSULTS
CONSULTATION  Zandra Trammell is a 74 y.o. female asked to see us in consultation by  Tramaine Michael MD & Fernando Manuel MD for evaluation of hiatal hernia, esophageal varices.    Ms. Trammell is a 74-year-old past medical history of anxiety, cryptogenic cirrhosis and Crohn's disease who presents to the ER today with nausea, vomiting and weakness.  She had a fever of 102 upon arrival.  She was admitted with SIRS.  GI consulted for history of esophageal varices and a hiatal hernia.    Patient is able to provide some history although has intermittent moment of confusion/ delayed responses.  She states that her GI symptoms have been ongoing for the past day or so.  She denies abdominal pain at present.   No diarrhea.  She reports difficulty walking.  She says she takes rifaximin daily although is supposed to be taking it twice daily.  She also reports the use of lactulose.  She states she did not take either medications for a few days.    It appears her cirrhosis was initially diagnosed in 2018 while she was hospitalized with acute onset confusion and melena.  She had elevated ammonia levels at that time with CT scan showing changes consistent with cirrhosis.  An EGD showed a 2-3+ varices which were banded.  She subsequently followed up in office and underwent a serological workup which was unremarkable.  This led to a liver biopsy which demonstrated mild steatosis and focal mild nonspecific triaditis.  She has been followed over the years for HCC and variceal screening.    She last had banding of her esophageal varices 12/2024.  Most recent endoscopy 4/9/2025 demonstrated no varices. A hiatal hernia was also seen.  At her last OV 7/2024 lactulose was stopped due to diarrhea. She later restarted it after stool tests came back negative.  US 2023 suggested possibility of PVT and splenic mass, but subsequent MRI

## 2025-05-28 NOTE — PROGRESS NOTES
4 Eyes Skin Assessment     The patient is being assess for  Admission    I agree that 2 RN's have performed a thorough Head to Toe Skin Assessment on the patient. ALL assessment sites listed below have been assessed.       Areas assessed by both nurses: Napoleon Stubbs RN  [x]   Head, Face, and Ears   [x]   Shoulders, Back, and Chest  [x]   Arms, Elbows, and Hands   [x]   Coccyx, Sacrum, and Ischum  [x]   Legs, Feet, and Heels        Does the Patient have Skin Breakdown?  No         Aneudy Prevention initiated:  Yes   Wound Care Orders initiated:  NA      [facility] nurse consulted for Pressure Injury (Stage 3,4, Unstageable, DTI, NWPT, and Complex wounds):  NA      Nurse 1 eSignature: Electronically signed by Napoleon Cisneros RN on 5/28/25 at 3:08 PM EDT    **SHARE this note so that the co-signing nurse is able to place an eSignature**    Nurse 2 eSignature: Electronically signed by Evelyne Santoro RN on 5/28/25 at 6:36 PM EDT

## 2025-05-28 NOTE — ED PROVIDER NOTES
Emergency Department Provider Note  Location: [facility] EMERGENCY DEPARTMENT  5/28/2025     Patient Identification  Zandra Trammell is a 74 y.o. female    Chief Complaint  Vomiting (Per EMS report pt has been vomiting for the past couple of hours. She has a hx of liver disease with hepatic encephalopathy.  reporting pt was \"fine\" yesterday and had an appt with her \"sleep doctor\" aka neurologist. Patient is legally blind. )      Mode of Arrival  EMS    HPI  (History provided by patient)  This is a 74 y.o. female with a PMH significant for memory issues, portal hypertension with cirrhosis  presented today for nausea, vomiting and generalized weakness.   reports that symptoms started within the last few hours.  States that she has issues with her ammonia levels and has a history of hepatic encephalopathy.  He reports that her symptoms are similar to when her ammonia levels been elevated in the past.  She has had nausea, nonbilious nonbloody emesis.  States that she is mainly in bed throughout the day and can only get up to use the bathroom.  She has generalized abdominal pain.  She has had a cough but denies any chest pain or shortness of breath.    ROS  Review of Systems   Constitutional:  Positive for fatigue.   Respiratory:  Positive for cough.    Gastrointestinal:  Positive for nausea and vomiting.   All other systems reviewed and are negative.          I have reviewed the following nursing documentation:  Allergies:   Allergies   Allergen Reactions    Codeine Anxiety       Past medical history:  has a past medical history of Cirrhosis (HCC), Hepatic encephalopathy (HCC), Insomnia, Liver disease, and Sleep apnea.    Past surgical history:  has a past surgical history that includes Esophagogastroduodenoscopy.    Home medications:   Prior to Admission medications    Medication Sig Start Date End Date Taking? Authorizing Provider   buPROPion (WELLBUTRIN XL) 150 MG extended release tablet Take 1

## 2025-05-28 NOTE — ED NOTES
Zandra Trammell is a 74 y.o. female admitted for  Principal Problem:    SOB (shortness of breath)  Resolved Problems:    * No resolved hospital problems. *  .   Patient Home via EMS transportation with   Chief Complaint   Patient presents with    Vomiting     Per EMS report pt has been vomiting for the past couple of hours. She has a hx of liver disease with hepatic encephalopathy.  reporting pt was \"fine\" yesterday and had an appt with her \"sleep doctor\" aka neurologist. Patient is legally blind.    .  Patient is alert and Person, Place, Time, and Situation  Patient's baseline mobility: Up x2 max assist  Code Status: Full Code   Cardiac Rhythm:  NSR     Is patient on baseline Oxygen: no how many Liters:   Abnormal Assessment Findings:     Isolation: None      NIH Score:    C-SSRS: Risk of Suicide: No Risk  Bedside swallow:        Active LDA's:   Peripheral IV 05/28/25 Left Antecubital (Active)   Site Assessment Clean, dry & intact 05/28/25 0637   Line Status Blood return noted 05/28/25 0637   Phlebitis Assessment No symptoms 05/28/25 0637   Infiltration Assessment 0 05/28/25 0637       Peripheral IV 05/28/25 Right Antecubital (Active)     Patient admitted with a gomes: no If the gomes is chronic was it exchanged:  Reason for gomes:   Patient admitted with Central Line:  . PICC line placement confirmed: YES OR NO:[id number]}   Reason for Central line:   Was central line Inserted from an outside facility:        Family/Caregiver Present no Any Concerns: no   Restraints no  Sitter no         Vitals: MEWS Score: 3    Vitals:    05/28/25 0910 05/28/25 0930 05/28/25 1100 05/28/25 1300   BP: 122/60 132/75 (!) 114/59 115/81   Pulse: 74 78 72 92   Resp:   16 21   Temp:  98.2 °F (36.8 °C)  98.8 °F (37.1 °C)   TempSrc:  Oral  Oral   SpO2: 96% 97% 96% 99%   Weight:       Height:           Last documented pain score (0-10 scale) Pain Level: 0  Pain medication administered No.    Pertinent or High Risk Medications/Drips:

## 2025-05-28 NOTE — PROGRESS NOTES
Patient from ED, report in hand off note. Assessment completed and documented. VSS. Alert. Oriented to self, could not say entire birthday only \"[birth year]\", asked where she was and she said [place], unsure why.  said she was able to answer these questions last night. Denies pain. Very thirsty. Nonproductive cough, lungs clear. Bedbound 98% of the time at home per .  is primary caregiver at this time. Bed locked and in lowest position. Bedside table and call light within reach. Denies further needs at this time.    When reassessing patient, she was able to say her name, , and where she was, but did not know what todays date was.      Swallows pills whole, one at a time with water. Sometimes doesn't understand the concept of the straw.

## 2025-05-28 NOTE — CARE COORDINATION
Case Management Assessment  Initial Evaluation    Date/Time of Evaluation: 5/28/2025 12:30 PM  Assessment Completed by: CARLOS Mendoza    If patient is discharged prior to next notation, then this note serves as note for discharge by case management.    Patient Name: Zandra Trammell                   YOB: 1950  Diagnosis: SOB (shortness of breath) [R06.02]                   Date / Time: 5/28/2025  6:21 AM    Patient Admission Status: Inpatient   Readmission Risk (Low < 19, Mod (19-27), High > 27): Readmission Risk Score: 8.5    Current PCP: Tramaine Michael MD  PCP verified by CM? (P) Yes    Chart Reviewed: Yes      History Provided by: Patient  Patient Orientation: Alert and Oriented    Patient Cognition: Alert    Hospitalization in the last 30 days (Readmission):  No    If yes, Readmission Assessment in CM Navigator will be completed.    Advance Directives:      Code Status: Full Code   Patient's Primary Decision Maker is: (P) Legal Next of Kin      Discharge Planning:    Patient lives with: (P) Spouse/Significant Other Type of Home: (P) House  Primary Care Giver: Self  Patient Support Systems include: Spouse/Significant Other   Current Financial resources: (P) None  Current community resources: (P) ECF/Home Care  Current services prior to admission: (P) None            Current DME:              Type of Home Care services:  (P) None    ADLS  Prior functional level: (P) Assistance with the following:, Housework, Shopping, Cooking  Current functional level: (P) Assistance with the following:, Cooking, Housework, Shopping, Bathing, Dressing, Toileting, Mobility    PT AM-PAC:   /24  OT AM-PAC:   /24    Family can provide assistance at DC: (P) Yes  Would you like Case Management to discuss the discharge plan with any other family members/significant others, and if so, who? (P) No  Plans to Return to Present Housing: (P) Yes  Other Identified Issues/Barriers to RETURNING to current housing:  weakness  Potential Assistance needed at discharge: (P) Skilled Nursing Facility, Home Care            Potential DME:    Patient expects to discharge to: (P) House  Plan for transportation at discharge: (P) Self    Financial    Payor: MEDICARE / Plan: MEDICARE PART A AND B / Product Type: *No Product type* /     Does insurance require precert for SNF: No    Potential assistance Purchasing Medications: (P) No  Meds-to-Beds request:      No Pharmacies Listed    Notes:    Factors facilitating achievement of predicted outcomes: Family support, Cooperative, and Pleasant    Barriers to discharge: Medical complications    Additional Case Management Notes: Referred to patient for d/c planning.  Spoke to patient.  Patient is a 74 year old female admitted for sepsis.  Patient usually lives at home with .  Patient reports she is independent in ADLs.  Discussed possibly home care vs. SNF.  Patient currently denies.  CM to follow for needs.     The Plan for Transition of Care is related to the following treatment goals of SOB (shortness of breath) [R06.02]    IF APPLICABLE: The Patient and/or patient representative Zandra and her family were provided with a choice of provider and agrees with the discharge plan. Freedom of choice list with basic dialogue that supports the patient's individualized plan of care/goals and shares the quality data associated with the providers was provided to:     Patient Representative Name:       The Patient and/or Patient Representative Agree with the Discharge Plan?      CARLOS Mendoza, VEL   Case Management Department  Ph: [phone] Fax: [phone]

## 2025-05-28 NOTE — PROGRESS NOTES
Physical Therapy    Attempted to see patient for PT evaluation, unable due to patient moving to new floor and RN completing assessment. Will reattempt as therapy schedule allows.    Aydee Garcia PT, DPT

## 2025-05-29 ENCOUNTER — APPOINTMENT (OUTPATIENT)
Dept: GENERAL RADIOLOGY | Age: 75
DRG: 871 | End: 2025-05-29
Payer: MEDICARE

## 2025-05-29 LAB
25(OH)D3 SERPL-MCNC: 20.8 NG/ML
AMMONIA PLAS-SCNC: 76 UMOL/L (ref 11–51)
AMPHETAMINES UR QL SCN>1000 NG/ML: NORMAL
ANION GAP SERPL CALCULATED.3IONS-SCNC: 14 MMOL/L (ref 3–16)
APAP SERPL-MCNC: 7 UG/ML (ref 10–30)
BARBITURATES UR QL SCN>200 NG/ML: NORMAL
BASE EXCESS BLDA CALC-SCNC: -4.6 MMOL/L (ref -3–3)
BASOPHILS # BLD: 0 K/UL (ref 0–0.2)
BASOPHILS NFR BLD: 0.5 %
BENZODIAZ UR QL SCN>200 NG/ML: NORMAL
BUN SERPL-MCNC: 14 MG/DL (ref 7–20)
CALCIUM SERPL-MCNC: 8.4 MG/DL (ref 8.3–10.6)
CANNABINOIDS UR QL SCN>50 NG/ML: NORMAL
CHLORIDE SERPL-SCNC: 109 MMOL/L (ref 99–110)
CO2 BLDA-SCNC: 17.7 MMOL/L
CO2 SERPL-SCNC: 15 MMOL/L (ref 21–32)
COCAINE UR QL SCN: NORMAL
COHGB MFR BLDA: 0.6 % (ref 0–1.5)
CREAT SERPL-MCNC: 0.9 MG/DL (ref 0.6–1.2)
DEPRECATED RDW RBC AUTO: 12.5 % (ref 12.4–15.4)
DRUG SCREEN COMMENT UR-IMP: NORMAL
EOSINOPHIL # BLD: 0 K/UL (ref 0–0.6)
EOSINOPHIL NFR BLD: 0.2 %
FENTANYL SCREEN, URINE: NORMAL
FOLATE SERPL-MCNC: 21.2 NG/ML (ref 4.78–24.2)
GFR SERPLBLD CREATININE-BSD FMLA CKD-EPI: 66 ML/MIN/{1.73_M2}
GLUCOSE SERPL-MCNC: 93 MG/DL (ref 70–99)
HCO3 BLDA-SCNC: 17 MMOL/L (ref 21–29)
HCT VFR BLD AUTO: 36.2 % (ref 36–48)
HGB BLD-MCNC: 12.6 G/DL (ref 12–16)
HGB BLDA-MCNC: 12.7 G/DL (ref 12–16)
LACTATE BLDV-SCNC: 3 MMOL/L (ref 0.4–2)
LACTATE BLDV-SCNC: 3.3 MMOL/L (ref 0.4–2)
LYMPHOCYTES # BLD: 0.4 K/UL (ref 1–5.1)
LYMPHOCYTES NFR BLD: 7 %
MAGNESIUM SERPL-MCNC: 1.88 MG/DL (ref 1.8–2.4)
MCH RBC QN AUTO: 32.3 PG (ref 26–34)
MCHC RBC AUTO-ENTMCNC: 34.7 G/DL (ref 31–36)
MCV RBC AUTO: 93.2 FL (ref 80–100)
METHADONE UR QL SCN>300 NG/ML: NORMAL
METHGB MFR BLDA: 0.1 %
MONOCYTES # BLD: 0.6 K/UL (ref 0–1.3)
MONOCYTES NFR BLD: 11.2 %
NEUTROPHILS # BLD: 4.4 K/UL (ref 1.7–7.7)
NEUTROPHILS NFR BLD: 81.1 %
O2 THERAPY: ABNORMAL
OPIATES UR QL SCN>300 NG/ML: NORMAL
OXYCODONE UR QL SCN: NORMAL
PCO2 BLDA: 23.2 MMHG (ref 35–45)
PCP UR QL SCN>25 NG/ML: NORMAL
PH BLDA: 7.48 [PH] (ref 7.35–7.45)
PH UR STRIP: 7 [PH]
PHOSPHATE SERPL-MCNC: 3.3 MG/DL (ref 2.5–4.9)
PLATELET # BLD AUTO: 88 K/UL (ref 135–450)
PLATELET BLD QL SMEAR: ABNORMAL
PMV BLD AUTO: 8.2 FL (ref 5–10.5)
PO2 BLDA: 66.7 MMHG (ref 75–108)
POTASSIUM SERPL-SCNC: 3.7 MMOL/L (ref 3.5–5.1)
RBC # BLD AUTO: 3.89 M/UL (ref 4–5.2)
REASON FOR REJECTION: NORMAL
REJECTED TEST: NORMAL
SALICYLATES SERPL-MCNC: <0.5 MG/DL (ref 15–30)
SAO2 % BLDA: 93.5 %
SLIDE REVIEW: ABNORMAL
SODIUM SERPL-SCNC: 138 MMOL/L (ref 136–145)
TSH SERPL DL<=0.005 MIU/L-ACNC: 1.27 UIU/ML (ref 0.27–4.2)
VIT B12 SERPL-MCNC: 481 PG/ML (ref 211–911)
WBC # BLD AUTO: 5.4 K/UL (ref 4–11)

## 2025-05-29 PROCEDURE — 84425 ASSAY OF VITAMIN B-1: CPT

## 2025-05-29 PROCEDURE — 82140 ASSAY OF AMMONIA: CPT

## 2025-05-29 PROCEDURE — 36415 COLL VENOUS BLD VENIPUNCTURE: CPT

## 2025-05-29 PROCEDURE — 82607 VITAMIN B-12: CPT

## 2025-05-29 PROCEDURE — 71045 X-RAY EXAM CHEST 1 VIEW: CPT

## 2025-05-29 PROCEDURE — 6370000000 HC RX 637 (ALT 250 FOR IP)

## 2025-05-29 PROCEDURE — 84443 ASSAY THYROID STIM HORMONE: CPT

## 2025-05-29 PROCEDURE — 97166 OT EVAL MOD COMPLEX 45 MIN: CPT

## 2025-05-29 PROCEDURE — 97535 SELF CARE MNGMENT TRAINING: CPT

## 2025-05-29 PROCEDURE — 80179 DRUG ASSAY SALICYLATE: CPT

## 2025-05-29 PROCEDURE — 83605 ASSAY OF LACTIC ACID: CPT

## 2025-05-29 PROCEDURE — 97530 THERAPEUTIC ACTIVITIES: CPT

## 2025-05-29 PROCEDURE — 94761 N-INVAS EAR/PLS OXIMETRY MLT: CPT

## 2025-05-29 PROCEDURE — 2580000003 HC RX 258

## 2025-05-29 PROCEDURE — 1200000000 HC SEMI PRIVATE

## 2025-05-29 PROCEDURE — 84100 ASSAY OF PHOSPHORUS: CPT

## 2025-05-29 PROCEDURE — 82746 ASSAY OF FOLIC ACID SERUM: CPT

## 2025-05-29 PROCEDURE — 85025 COMPLETE CBC W/AUTO DIFF WBC: CPT

## 2025-05-29 PROCEDURE — 6360000002 HC RX W HCPCS

## 2025-05-29 PROCEDURE — 97162 PT EVAL MOD COMPLEX 30 MIN: CPT

## 2025-05-29 PROCEDURE — 82306 VITAMIN D 25 HYDROXY: CPT

## 2025-05-29 PROCEDURE — 36600 WITHDRAWAL OF ARTERIAL BLOOD: CPT

## 2025-05-29 PROCEDURE — 82803 BLOOD GASES ANY COMBINATION: CPT

## 2025-05-29 PROCEDURE — 97116 GAIT TRAINING THERAPY: CPT

## 2025-05-29 PROCEDURE — 83735 ASSAY OF MAGNESIUM: CPT

## 2025-05-29 PROCEDURE — 2700000000 HC OXYGEN THERAPY PER DAY

## 2025-05-29 PROCEDURE — 2500000003 HC RX 250 WO HCPCS

## 2025-05-29 PROCEDURE — 6360000002 HC RX W HCPCS: Performed by: NURSE PRACTITIONER

## 2025-05-29 PROCEDURE — 6370000000 HC RX 637 (ALT 250 FOR IP): Performed by: NURSE PRACTITIONER

## 2025-05-29 PROCEDURE — 80143 DRUG ASSAY ACETAMINOPHEN: CPT

## 2025-05-29 PROCEDURE — 80048 BASIC METABOLIC PNL TOTAL CA: CPT

## 2025-05-29 RX ORDER — POTASSIUM CHLORIDE 1500 MG/1
20 TABLET, EXTENDED RELEASE ORAL
Status: DISCONTINUED | OUTPATIENT
Start: 2025-05-29 | End: 2025-05-31

## 2025-05-29 RX ORDER — MAGNESIUM SULFATE IN WATER 40 MG/ML
2000 INJECTION, SOLUTION INTRAVENOUS ONCE
Status: DISCONTINUED | OUTPATIENT
Start: 2025-05-29 | End: 2025-06-02

## 2025-05-29 RX ORDER — 0.9 % SODIUM CHLORIDE 0.9 %
500 INTRAVENOUS SOLUTION INTRAVENOUS ONCE
Status: COMPLETED | OUTPATIENT
Start: 2025-05-29 | End: 2025-05-29

## 2025-05-29 RX ADMIN — ONDANSETRON 4 MG: 2 INJECTION, SOLUTION INTRAMUSCULAR; INTRAVENOUS at 18:17

## 2025-05-29 RX ADMIN — PANTOPRAZOLE SODIUM 40 MG: 40 TABLET, DELAYED RELEASE ORAL at 05:33

## 2025-05-29 RX ADMIN — LACTULOSE 20 G: 20 SOLUTION ORAL at 08:58

## 2025-05-29 RX ADMIN — RIFAXIMIN 400 MG: 200 TABLET ORAL at 08:58

## 2025-05-29 RX ADMIN — ONDANSETRON 4 MG: 2 INJECTION, SOLUTION INTRAMUSCULAR; INTRAVENOUS at 12:28

## 2025-05-29 RX ADMIN — RIFAXIMIN 400 MG: 200 TABLET ORAL at 13:35

## 2025-05-29 RX ADMIN — WATER 2000 MG: 1 INJECTION INTRAMUSCULAR; INTRAVENOUS; SUBCUTANEOUS at 08:58

## 2025-05-29 RX ADMIN — ACETAMINOPHEN 650 MG: 325 TABLET ORAL at 12:40

## 2025-05-29 RX ADMIN — SODIUM CHLORIDE 500 ML: 0.9 INJECTION, SOLUTION INTRAVENOUS at 11:46

## 2025-05-29 RX ADMIN — SODIUM CHLORIDE, SODIUM LACTATE, POTASSIUM CHLORIDE, AND CALCIUM CHLORIDE: .6; .31; .03; .02 INJECTION, SOLUTION INTRAVENOUS at 09:07

## 2025-05-29 RX ADMIN — BUPROPION HYDROCHLORIDE 150 MG: 150 TABLET, EXTENDED RELEASE ORAL at 08:58

## 2025-05-29 RX ADMIN — PROPRANOLOL HYDROCHLORIDE 60 MG: 40 TABLET ORAL at 09:20

## 2025-05-29 RX ADMIN — PROCHLORPERAZINE EDISYLATE 10 MG: 5 INJECTION INTRAMUSCULAR; INTRAVENOUS at 19:33

## 2025-05-29 RX ADMIN — LACTULOSE 20 G: 20 SOLUTION ORAL at 13:35

## 2025-05-29 RX ADMIN — VANCOMYCIN HYDROCHLORIDE 1500 MG: 10 INJECTION, POWDER, LYOPHILIZED, FOR SOLUTION INTRAVENOUS at 09:55

## 2025-05-29 ASSESSMENT — PAIN DESCRIPTION - DESCRIPTORS: DESCRIPTORS: ACHING

## 2025-05-29 ASSESSMENT — PAIN SCALES - GENERAL: PAINLEVEL_OUTOF10: 6

## 2025-05-29 ASSESSMENT — PAIN - FUNCTIONAL ASSESSMENT: PAIN_FUNCTIONAL_ASSESSMENT: ACTIVITIES ARE NOT PREVENTED

## 2025-05-29 ASSESSMENT — PAIN DESCRIPTION - LOCATION: LOCATION: HEAD

## 2025-05-29 NOTE — PROGRESS NOTES
Assessment completed and documented. VSS. Alert and oriented to self and place. Complete/ total feed. Takes pills one at a time without water. Pt/ot at bedside. Denies pain. Bed locked and in lowest position. Bedside table and call light within reach. Denies further needs at this time.    Notified Dr. Manuel about lactic, see orders.

## 2025-05-29 NOTE — CARE COORDINATION
Chart reviewed. Spoke with  via phone. Discussed therapy recs for SNF.  thinks it is a good idea. Emailed list of facilities to [email or web address] for review. Provided my contact info to call with referral options. Harmeet Cleary RN

## 2025-05-29 NOTE — PLAN OF CARE
Problem: Safety - Adult  Goal: Free from fall injury  5/29/2025 0911 by Napoleon Cisneros, RN  Outcome: Progressing  5/28/2025 1935 by Marilou Fowler, RN  Outcome: Progressing     Problem: Skin/Tissue Integrity  Goal: Skin integrity remains intact  Description: 1.  Monitor for areas of redness and/or skin breakdown2.  Assess vascular access sites hourly3.  Every 4-6 hours minimum:  Change oxygen saturation probe site4.  Every 4-6 hours:  If on nasal continuous positive airway pressure, respiratory therapy assess nares and determine need for appliance change or resting period  5/29/2025 0911 by Napoleon Cisneros, RN  Outcome: Progressing  5/28/2025 1935 by Marilou Fowler, RN  Outcome: Progressing

## 2025-05-29 NOTE — PROGRESS NOTES
Occupational Therapy  Facility/Department: [facility] C3 TELE/MED SURG/ONC  Occupational Therapy Initial Assessment and Treatment    Name: Zandra Trammell  : 1950  MRN: [id number]  Date of Service: 2025    Discharge Recommendations:  Subacute/Skilled Nursing Facility  OT Equipment Recommendations  Equipment Needed: No  Other: defer     Therapy discharge recommendations are subject to collaboration from the patient’s interdisciplinary healthcare team, including MD and case management recommendations.    Barriers to Home Discharge:   [x] Steps to access home entry or bed/bath:   [x] Unable to transfer, ambulate, or propel wheelchair household distances without assist   [] Limited available assist at home upon discharge    [] Patient or family requests d/c to post-acute facility    [x] Poor cognition/safety awareness for d/c to home alone    [] Unable to maintain ordered weight bearing status    [] Patient with salient signs of long-standing immobility   [x] Decreased independence with ADLs   [x] Increased risk for falls   [] Other:    If pt is unable to be seen after this session, please let this note serve as discharge summary.  Please see case management note for discharge disposition.  Thank you.     Patient Diagnosis(es): The primary encounter diagnosis was Septicemia (HCC). Diagnoses of Nausea and vomiting, unspecified vomiting type, Hepatic encephalopathy (HCC), and Thrombocytopenia were also pertinent to this visit.  Past Medical History:  has a past medical history of Cirrhosis (HCC), Hepatic encephalopathy (HCC), Insomnia, Liver disease, and Sleep apnea.  Past Surgical History:  has a past surgical history that includes Esophagogastroduodenoscopy.           Assessment  Performance deficits / Impairments: Decreased functional mobility ;Decreased ADL status;Decreased strength;Decreased safe awareness;Decreased cognition;Decreased endurance;Decreased coordination;Decreased balance;Decreased high-level

## 2025-05-29 NOTE — CONSULTS
Pharmacy Note  Vancomycin Consult    Zandra Trammell is a 74 y.o. female started on Vancomycin for Sepsis; consult received from Dr. Manuel to manage therapy. Also receiving the following antibiotics: Rocephin.    Allergies:  Codeine     Tmax: 102.1    Micro: processing    Recent Labs     05/28/25  0636 05/29/25  0502   CREATININE 0.8 0.9       Recent Labs     05/28/25  0636 05/29/25  0705   WBC 5.1 5.4       Estimated Creatinine Clearance: 52 mL/min (based on SCr of 0.9 mg/dL).      Intake/Output Summary (Last 24 hours) at 5/29/2025 0805  Last data filed at 5/29/2025 0803  Gross per 24 hour   Intake 2206.65 ml   Output 852 ml   Net 1354.65 ml       Wt Readings from Last 1 Encounters:   05/28/25 77 kg (169 lb 12.1 oz)         Body mass index is 30.07 kg/m².    Loading dose (critically ill or in ICU, require dialysis or renal replacement therapy): Vancomycin 25 mg/kg IVPB x 1 (maximum 2500 mg).  Maintenance dose: 10-20 mg/kg (maximum: 2000 mg/dose and 4500 mg/day) starting at the next dosing interval determined by renal function  Pulse dose: fluctuating renal function, DUANE, ESRD  CRRT: 7.5-10 mg/kg q12h   Goal Vancomycin trough: 15-20 mcg/mL   Goal Vancomycin AUC: 400-600     Assessment/Plan:  Received Vancomycin with a one time loading dose of 1750 mg x1, followed by 1500 mg IV every 24 hours. Calculated Vancomycin AUC = 522 mg/L*h with an estimated steady-state vancomycin trough = 10.9 mcg/mL. Vancomycin level ordered for 5/30 0600. Timing of trough level will be determined based on culture results, renal function, and clinical response.     Thank you for the consult.  Nati Prince, DmitriyD, BCPS  5/29/2025 at 8:07 AM      ---------------------------------------------------------------                                     Vancomycin Progress Note  Day: 2 Indication: Sepsis Other Antibiotics: Ceftriaxone     No results for input(s): \"VANCOTROUGH\" in the last 72 hours.  Recent Labs     05/30/25  5127

## 2025-05-29 NOTE — PROGRESS NOTES
PROGRESS NOTE    HPI: Zandra Trammell is a(n)74 y.o. female admitted for work-up and treatment for Hepatic encephalopathy (HCC) [K76.82]  SOB (shortness of breath) [R06.02]  Thrombocytopenia [D69.6]  Septicemia (HCC) [A41.9]  Nausea and vomiting, unspecified vomiting type [R11.2].     We are following for cirrhosis, HE.    Subjective:     Mental status about the same - she stares off during our conversation. She does not know her birthday, but answers her name and location.  She had a low grade fever last night.  Having diarrhea on lactulose.  Last vomited last night.     present at bedside today - says she had been doing great, and change in status (weakness, confusion) happened suddenly.  Her psych and neurologist took her off several medications 6 months ago which resulted in marked improvement in overall functional status.      Objective:     I/O last 3 completed shifts:  In: 120 [P.O.:120]  Out: 352 [Urine:350; Emesis/NG output:2]      /66   Pulse 78   Temp 98.1 °F (36.7 °C) (Oral)   Resp 18   Ht 1.6 m (5' 3\")   Wt 77 kg (169 lb 12.1 oz)   SpO2 93%   BMI 30.07 kg/m²     Physical Exam:  HEENT: anicteric sclera, oropharyngeal membranes pink and moist.  Cor: RRR  Lungs: non-labored, no respiratory distress  Abdomen: obese, soft, NT. No ascites.  No hepatomegaly or splenomegaly  Extremities: no edema  Neuro: alert, delayed response, oriented to person (name, not ) and place. No asterixis.      Results:   Lab Results   Component Value Date    ALT 13 2025    AST 33 2025    ALKPHOS 69 2025    INR 1.38 (H) 2025    LIPASE 36.0 2025     Lab Results   Component Value Date    WBC 5.4 2025    HGB 12.6 2025    HCT 36.2 2025    MCV 93.2 2025    PLT 88 (L) 2025     BUN/Cr/glu/ALT/AST/amyl/lip:  14/0.9/--/--/--/--/-- ( 0502)  CT HEAD WO CONTRAST  Result Date: 2025  Impression: No

## 2025-05-29 NOTE — PROGRESS NOTES
Physical Therapy  Facility/Department: [facility] C3 TELE/MED SURG/ONC  Physical Therapy Initial Assessment/Treatment     Name: Zandra Trammell  : 1950  MRN: [id number]  Date of Service: 2025    Discharge Recommendations:  Subacute/Skilled Nursing Facility   PT Equipment Recommendations  Equipment Needed: No  Other: Defer      Patient Diagnosis(es): The primary encounter diagnosis was Septicemia (HCC). Diagnoses of Nausea and vomiting, unspecified vomiting type, Hepatic encephalopathy (HCC), and Thrombocytopenia were also pertinent to this visit.  Past Medical History:  has a past medical history of Cirrhosis (HCC), Hepatic encephalopathy (HCC), Insomnia, Liver disease, and Sleep apnea.  Past Surgical History:  has a past surgical history that includes Esophagogastroduodenoscopy.    Barriers to Home Discharge:   [x] Steps to access home entry or bed/bath:   [x] Unable to transfer, ambulate, or propel wheelchair household distances without assist   [] Limited available assist at home upon discharge    [] Patient or family requests d/c to post-acute facility    [x] Poor cognition/safety awareness for d/c to home alone    [] Unable to maintain ordered weight bearing status    [] Patient with salient signs of long-standing immobility   [x] Decreased independence with ADLs   [x] Increased risk for falls   [] Other:    Assessment  Body Structures, Functions, Activity Limitations Requiring Skilled Therapeutic Intervention: Decreased safe awareness;Decreased functional mobility ;Decreased endurance;Decreased posture;Decreased balance;Decreased strength;Decreased ROM  Assessment: Pt to [facility] with diagnosis of SOB. PTA pt lives with her  in a 2 story house with her . Pt reports being independent with transfers and ambulation with no AD in the house but per chart review  reports pt is 98% bed bound. Pt currently presents below baseline function. Requires mod Ax2 for bed mobility, min Ax2 for

## 2025-05-29 NOTE — PROGRESS NOTES
Hospital Medicine Progress Note  V 5.17      Date of Admission: 5/28/2025    Hospital Day: 2      Chief Admission Complaint:  Nausea, vomiting, weakness, couldn't walk     Subjective:  Patient seen at bedside this morning. No fevers, chills, pain anywhere, nausea, vomiting. Patient had a large bowel movement overnight per nurse.    Presenting Admission History:       74 y.o. female who presented to [facility] with Nausea, vomiting, weakness, couldn't walk.  PMHx significant for Anxiety, cirrhosis, pHTN. Patient seen at bedside this morning. Patient mentions ongoing nausea, vomiting, weakness and trouble walking ongoing for past day. Patient also mentions ongoing intermittent fevers with dry cough. Describes abdominal pain 5/10 in severity in middle of abdomen. Patient has some trouble with vision. No hematochezia, hematemesis, pain anywhere else, sob, hypoxic events. Patient at home was able to complete ADLs independently.       Assessment/Plan:      #Sepsis, severe  #Cirrhosis hx  #Portal HTN hx  #Anxiety hx  #Nausea, vomiting  #Weakness  SIRS 2/4: HR, Fevers, lactic acid  Plan:  Ceftriaxone and vancomycin in place  Cultures ordered, continue to follow  Continue home bupropion, rifaximin and propranolol  GI consulted, appreciate recommendations  Maintain oxygen saturation  IVF as needed  Trend lactic acid, ammonia as needed  PPI in place  PT/OT    Ongoing threat to life and/or bodily function without ongoing treatment due to:  Sepsis, cirrhosis    Consults:      PHARMACY TO DOSE VANCOMYCIN  IP CONSULT TO HOSPITALIST  IP CONSULT TO CASE MANAGEMENT  IP CONSULT TO GI  PHARMACY TO DOSE VANCOMYCIN        --------------------------------------------------      Medications:        Infusion Medications    sodium chloride      lactated ringers 100 mL/hr at 05/29/25 0929     Scheduled Medications    magnesium sulfate  2,000 mg IntraVENous Once    potassium chloride  20 mEq Oral Daily with breakfast

## 2025-05-30 ENCOUNTER — APPOINTMENT (OUTPATIENT)
Dept: MRI IMAGING | Age: 75
DRG: 871 | End: 2025-05-30
Payer: MEDICARE

## 2025-05-30 LAB
AFP-TM SERPL-MCNC: 3.8 UG/L
ANION GAP SERPL CALCULATED.3IONS-SCNC: 9 MMOL/L (ref 3–16)
BASOPHILS # BLD: 0 K/UL (ref 0–0.2)
BASOPHILS NFR BLD: 0.7 %
BUN SERPL-MCNC: 12 MG/DL (ref 7–20)
CALCIUM SERPL-MCNC: 8.4 MG/DL (ref 8.3–10.6)
CHLORIDE SERPL-SCNC: 111 MMOL/L (ref 99–110)
CO2 SERPL-SCNC: 20 MMOL/L (ref 21–32)
CREAT SERPL-MCNC: 0.7 MG/DL (ref 0.6–1.2)
DEPRECATED RDW RBC AUTO: 12.8 % (ref 12.4–15.4)
EOSINOPHIL # BLD: 0 K/UL (ref 0–0.6)
EOSINOPHIL NFR BLD: 1.6 %
GFR SERPLBLD CREATININE-BSD FMLA CKD-EPI: 89 ML/MIN/{1.73_M2}
GLUCOSE SERPL-MCNC: 94 MG/DL (ref 70–99)
HCT VFR BLD AUTO: 32.8 % (ref 36–48)
HGB BLD-MCNC: 11.8 G/DL (ref 12–16)
LACTATE BLDV-SCNC: 1.9 MMOL/L (ref 0.4–2)
LACTATE BLDV-SCNC: 2.3 MMOL/L (ref 0.4–2)
LYMPHOCYTES # BLD: 0.5 K/UL (ref 1–5.1)
LYMPHOCYTES NFR BLD: 16.7 %
MCH RBC QN AUTO: 33.8 PG (ref 26–34)
MCHC RBC AUTO-ENTMCNC: 36.1 G/DL (ref 31–36)
MCV RBC AUTO: 93.7 FL (ref 80–100)
MONOCYTES # BLD: 0.6 K/UL (ref 0–1.3)
MONOCYTES NFR BLD: 20.1 %
NEUTROPHILS # BLD: 1.8 K/UL (ref 1.7–7.7)
NEUTROPHILS NFR BLD: 60.9 %
PLATELET # BLD AUTO: 49 K/UL (ref 135–450)
PMV BLD AUTO: 8.4 FL (ref 5–10.5)
POTASSIUM SERPL-SCNC: 3.7 MMOL/L (ref 3.5–5.1)
RBC # BLD AUTO: 3.5 M/UL (ref 4–5.2)
SODIUM SERPL-SCNC: 140 MMOL/L (ref 136–145)
VANCOMYCIN SERPL-MCNC: 8.4 UG/ML
WBC # BLD AUTO: 3 K/UL (ref 4–11)

## 2025-05-30 PROCEDURE — 6360000002 HC RX W HCPCS

## 2025-05-30 PROCEDURE — 36415 COLL VENOUS BLD VENIPUNCTURE: CPT

## 2025-05-30 PROCEDURE — 97535 SELF CARE MNGMENT TRAINING: CPT

## 2025-05-30 PROCEDURE — 2580000003 HC RX 258: Performed by: INTERNAL MEDICINE

## 2025-05-30 PROCEDURE — 6370000000 HC RX 637 (ALT 250 FOR IP): Performed by: NURSE PRACTITIONER

## 2025-05-30 PROCEDURE — 2500000003 HC RX 250 WO HCPCS

## 2025-05-30 PROCEDURE — 97530 THERAPEUTIC ACTIVITIES: CPT

## 2025-05-30 PROCEDURE — 94761 N-INVAS EAR/PLS OXIMETRY MLT: CPT

## 2025-05-30 PROCEDURE — 2580000003 HC RX 258

## 2025-05-30 PROCEDURE — 70551 MRI BRAIN STEM W/O DYE: CPT

## 2025-05-30 PROCEDURE — 6360000002 HC RX W HCPCS: Performed by: NURSE PRACTITIONER

## 2025-05-30 PROCEDURE — 1200000000 HC SEMI PRIVATE

## 2025-05-30 PROCEDURE — 80202 ASSAY OF VANCOMYCIN: CPT

## 2025-05-30 PROCEDURE — 83605 ASSAY OF LACTIC ACID: CPT

## 2025-05-30 PROCEDURE — 6370000000 HC RX 637 (ALT 250 FOR IP)

## 2025-05-30 PROCEDURE — 80048 BASIC METABOLIC PNL TOTAL CA: CPT

## 2025-05-30 PROCEDURE — 6360000002 HC RX W HCPCS: Performed by: INTERNAL MEDICINE

## 2025-05-30 PROCEDURE — 2700000000 HC OXYGEN THERAPY PER DAY

## 2025-05-30 PROCEDURE — 85025 COMPLETE CBC W/AUTO DIFF WBC: CPT

## 2025-05-30 PROCEDURE — 6370000000 HC RX 637 (ALT 250 FOR IP): Performed by: INTERNAL MEDICINE

## 2025-05-30 PROCEDURE — 92610 EVALUATE SWALLOWING FUNCTION: CPT

## 2025-05-30 PROCEDURE — 97116 GAIT TRAINING THERAPY: CPT

## 2025-05-30 RX ADMIN — LACTULOSE 20 G: 20 SOLUTION ORAL at 19:49

## 2025-05-30 RX ADMIN — GUAIFENESIN AND DEXTROMETHORPHAN 5 ML: 100; 10 SYRUP ORAL at 03:42

## 2025-05-30 RX ADMIN — SODIUM CHLORIDE, SODIUM LACTATE, POTASSIUM CHLORIDE, AND CALCIUM CHLORIDE: .6; .31; .03; .02 INJECTION, SOLUTION INTRAVENOUS at 12:08

## 2025-05-30 RX ADMIN — RIFAXIMIN 400 MG: 200 TABLET ORAL at 22:07

## 2025-05-30 RX ADMIN — PROCHLORPERAZINE EDISYLATE 10 MG: 5 INJECTION INTRAMUSCULAR; INTRAVENOUS at 03:43

## 2025-05-30 RX ADMIN — ENOXAPARIN SODIUM 40 MG: 100 INJECTION SUBCUTANEOUS at 11:57

## 2025-05-30 RX ADMIN — WATER 2000 MG: 1 INJECTION INTRAMUSCULAR; INTRAVENOUS; SUBCUTANEOUS at 11:56

## 2025-05-30 RX ADMIN — VANCOMYCIN HYDROCHLORIDE 1000 MG: 1 INJECTION, POWDER, LYOPHILIZED, FOR SOLUTION INTRAVENOUS at 12:10

## 2025-05-30 RX ADMIN — PROCHLORPERAZINE EDISYLATE 10 MG: 5 INJECTION INTRAMUSCULAR; INTRAVENOUS at 22:14

## 2025-05-30 RX ADMIN — ACETAMINOPHEN 650 MG: 325 TABLET ORAL at 19:49

## 2025-05-30 ASSESSMENT — PAIN DESCRIPTION - ORIENTATION: ORIENTATION: ANTERIOR

## 2025-05-30 ASSESSMENT — PAIN DESCRIPTION - PAIN TYPE: TYPE: ACUTE PAIN

## 2025-05-30 ASSESSMENT — PAIN SCALES - GENERAL: PAINLEVEL_OUTOF10: 7

## 2025-05-30 ASSESSMENT — PAIN - FUNCTIONAL ASSESSMENT: PAIN_FUNCTIONAL_ASSESSMENT: ACTIVITIES ARE NOT PREVENTED

## 2025-05-30 ASSESSMENT — PAIN DESCRIPTION - DESCRIPTORS: DESCRIPTORS: ACHING

## 2025-05-30 ASSESSMENT — PAIN DESCRIPTION - LOCATION: LOCATION: HEAD

## 2025-05-30 NOTE — PROGRESS NOTES
PS sent to Sabrina Bennett CNP.    Good morning. During report we were told patient was struggling last night with coughing and swallowing. Night shift made her NPO without changing orders. And stated they wanted speech to eval... Are you ok if I change her to NPO and order speech eval?? Thank you.       Read and Orders for NPO and Speech eval placed.      Electronically signed by Taylor Sheffield RN on 5/30/2025 at 8:23 AM

## 2025-05-30 NOTE — PLAN OF CARE
Problem: Safety - Adult  Goal: Free from fall injury  5/30/2025 1314 by Taylor Sheffield, RN  Outcome: Progressing  Flowsheets (Taken 5/30/2025 1314)  Free From Fall Injury: Instruct family/caregiver on patient safety     Problem: Skin/Tissue Integrity  Goal: Skin integrity remains intact  Description: 1.  Monitor for areas of redness and/or skin breakdown2.  Assess vascular access sites hourly3.  Every 4-6 hours minimum:  Change oxygen saturation probe site4.  Every 4-6 hours:  If on nasal continuous positive airway pressure, respiratory therapy assess nares and determine need for appliance change or resting period  5/30/2025 1314 by Taylor Sheffield, RN  Outcome: Progressing  Flowsheets (Taken 5/30/2025 1314)  Skin Integrity Remains Intact:   Monitor for areas of redness and/or skin breakdown   Assess vascular access sites hourly

## 2025-05-30 NOTE — PROGRESS NOTES
PROGRESS NOTE    HPI: Zandra Trammell is a(n)74 y.o. female admitted for work-up and treatment for Hepatic encephalopathy (HCC) [K76.82]  SOB (shortness of breath) [R06.02]  Thrombocytopenia [D69.6]  Septicemia (HCC) [A41.9]  Nausea and vomiting, unspecified vomiting type [R11.2].     We are following for cirrhosis, HE.    Subjective:     She became hypoxic last night and less responsive, there was concern for aspiration and she was made NPO.  Today she is well, answering questions appropriately working with PT.  Awaiting speech evaluation.  No further vomiting.  Last BM was yesterday - she had not had lactulose or rifaximin today.      Objective:     I/O last 3 completed shifts:  In: 3998.1 [P.O.:960; I.V.:2404.1; IV Piggyback:634]  Out: 2102 [Urine:2100; Emesis/NG output:2]      /73   Pulse 75   Temp 97.3 °F (36.3 °C) (Oral)   Resp 20   Ht 1.6 m (5' 3\")   Wt 77 kg (169 lb 12.1 oz)   SpO2 96%   BMI 30.07 kg/m²     Physical Exam:  HEENT: anicteric sclera, oropharyngeal membranes pink and moist.  Cor: RRR  Lungs: non-labored, no respiratory distress  Abdomen: obese, soft, NT. No ascites.  No hepatomegaly or splenomegaly  Extremities: no edema  Neuro: alert, oriented x 3, no asterixis.       Results:   Lab Results   Component Value Date    ALT 13 05/28/2025    AST 33 05/28/2025    ALKPHOS 69 05/28/2025    INR 1.38 (H) 05/28/2025    LIPASE 36.0 05/28/2025     Lab Results   Component Value Date    WBC 3.0 (L) 05/30/2025    HGB 11.8 (L) 05/30/2025    HCT 32.8 (L) 05/30/2025    MCV 93.7 05/30/2025    PLT 49 (L) 05/30/2025     BUN/Cr/glu/ALT/AST/amyl/lip:  12/0.7/--/--/--/--/-- (05/30 0541)  CT HEAD WO CONTRAST  Result Date: 5/28/2025  Impression: No acute intracranial abnormality or mass effect. Electronically signed by Bony Damico MD    CT ABDOMEN PELVIS W IV CONTRAST Additional Contrast? None  Result Date: 5/28/2025  1. No evidence of pulmonary

## 2025-05-30 NOTE — PROGRESS NOTES
4 Eyes Skin Assessment and Patient belongings     The patient is being assess for  Shift Handoff    I agree that 2 Nurses have performed a thorough Head to Toe Skin Assessment on the patient. ALL assessment sites listed below have been assessed.       Areas assessed by both nurses: Angel RN   [x]   Head, Face, and Ears   [x]   Shoulders, Back, and Chest  [x]   Arms, Elbows, and Hands   [x]   Coccyx, Sacrum, and IschIum  [x]   Legs, Feet, and Heels        Does the Patient have Skin Breakdown?  Yes LDA WOUND CARE was Initiated documentation include the Aleena-wound, Wound Assessment, Measurements, Dressing Treatment, Drainage, and Color\",         Aneudy Prevention initiated:  Yes   Wound Care Orders initiated:  NA      [facility] nurse consulted for Pressure Injury (Stage 3,4, Unstageable, DTI, NWPT, and Complex wounds), New and Established Ostomies:  NA      I agree that 2 Nurses have reviewed patient belongings with the patient/family and documented in the flowsheet upon admission or transfer to the unit.     Belongings  Dental Appliances: None  Vision - Corrective Lenses: None  Hearing Aid: None  Clothing: At bedside, Socks, Shirt, Pajamas, Pants  Jewelry: None  Body Piercings Removed: N/A  Electronic Devices: At home  Weapons (Notify Protective Services/Security): None  Other Valuables: At home  Home Medications: None  Valuables Given To: Family (Comment)  Provide Name(s) of Who Valuable(s) Were Given To: saroj  Responsible person(s) in the waiting room:        Nurse 1 eSignature: Electronically signed by Taylor Sheffield RN on 5/30/25 at 1:15 PM EDT    **SHARE this note so that the co-signing nurse is able to place an eSignature**    Nurse 2 eSignature: Electronically signed by Lan Cheng RN on 5/30/25 at 6:35 PM EDT

## 2025-05-30 NOTE — PROGRESS NOTES
fever curve     Cirrhosis cryptogenic with UC not currently on treatment   - Gastroenterology CONSULTED and SEEN w/ recs for - cont rifaximin and propanolol       Thrombocytopenia: likely liver sequela, monitor closely     Anxiety and depression: cont current meds    Debility and weakness: recent fall  - cont with PT/OT and safety       Ongoing threat to life and/or bodily function without ongoing treatment due to:      Consults:      PHARMACY TO DOSE VANCOMYCIN  IP CONSULT TO HOSPITALIST  IP CONSULT TO CASE MANAGEMENT  IP CONSULT TO GI  PHARMACY TO DOSE VANCOMYCIN  IP CONSULT TO SPIRITUAL SERVICES      .The following subspecialty service(s) Gastroenterology was/were consulted and any/all available notes from yesterday and today were reviewed on 5/30/2025, w/ plan for continued inpatient w/up and management as noted above in the assessment and plan    --------------------------------------------------      Medications:        Infusion Medications    sodium chloride      lactated ringers 100 mL/hr at 05/29/25 1732     Scheduled Medications    vancomycin  1,000 mg IntraVENous Q12H    magnesium sulfate  2,000 mg IntraVENous Once    potassium chloride  20 mEq Oral Daily with breakfast    acetaminophen  650 mg Oral Once    sodium chloride flush  5-40 mL IntraVENous 2 times per day    enoxaparin  40 mg SubCUTAneous Daily    buPROPion  150 mg Oral Daily    propranolol  60 mg Oral Daily    pantoprazole  40 mg Oral QAM AC    cefTRIAXone (ROCEPHIN) IV  2,000 mg IntraVENous Daily    rifAXIMin  400 mg Oral TID    lactulose  20 g Oral TID     PRN Meds: sodium chloride flush, sodium chloride, potassium chloride **OR** potassium alternative oral replacement **OR** potassium chloride, magnesium sulfate, ondansetron **OR** ondansetron, polyethylene glycol, acetaminophen **OR** acetaminophen, albuterol, guaiFENesin-dextromethorphan, prochlorperazine      Physical Exam Performed:      General appearance:  No apparent

## 2025-05-30 NOTE — PROGRESS NOTES
Shift assessment completed. Patient is A&O x4.  VSS.  Denies Pain.  IV site patent/ flushed, and infusing. Patient on 2 L. Patient's last BM- 5/29/25. Patient admits to passing gas. Patient ambulates with walker, X-1-SBA to bathroom.  Medication given per MAR.     Safety Measures in place:   Denies any needs at this time.   Video monitoring in place.  Bed/ Chair alarm on for safety.   Bed locked and in lowest position.    Call light within reach.   Gripper socks applied.   Patient in stable condition when RN leaving room.      Electronically signed by Taylor Sheffield RN on 5/30/2025 at 6:34 PM

## 2025-05-30 NOTE — PLAN OF CARE
Bedside swallow evaluation completed.    Maricel Deleon M.A. CCC-SLP  Speech Language Pathologist

## 2025-05-30 NOTE — CARE COORDINATION
Pt resting calmly in bed with eyes shut, no family bedside.  Writer called and spoke with pt's spouse/Jassi, he is touring [facility] now.  Jassi will call CM back with SNF preferences.  No precert needed, pt has Medicare and will meet 3night qualifying stay after tonight.  PAULINE Russell-JEREMIAS

## 2025-05-30 NOTE — PROGRESS NOTES
Occupational Therapy  Facility/Department: [facility] C3 TELE/MED SURG/ONC  Daily Treatment Note  NAME: Zandra Trammell  : 1950  MRN: [id number]    Date of Service: 2025    Discharge Recommendations:  Subacute/Skilled Nursing Facility  OT Equipment Recommendations  Equipment Needed: No  Other: defer    Therapy discharge recommendations are subject to collaboration from the patient’s interdisciplinary healthcare team, including MD and case management recommendations.    Barriers to Home Discharge:   [x] Steps to access home entry or bed/bath:   [x] Unable to transfer, ambulate, or propel wheelchair household distances without assist   [] Limited available assist at home upon discharge    [] Patient or family requests d/c to post-acute facility    [x] Poor cognition/safety awareness for d/c to home alone    [] Unable to maintain ordered weight bearing status    [] Patient with salient signs of long-standing immobility   [x] Decreased independence with ADLs   [x] Increased risk for falls   [] Other:    If pt is unable to be seen after this session, please let this note serve as discharge summary.  Please see case management note for discharge disposition.  Thank you.      Patient Diagnosis(es): The primary encounter diagnosis was Septicemia (HCC). Diagnoses of Nausea and vomiting, unspecified vomiting type, Hepatic encephalopathy (HCC), and Thrombocytopenia were also pertinent to this visit.     Assessment   Assessment: Pt seen for follow up OT/PT session this date. Co-treatment utilized to maximize safety and utilize the skill of two skilled therapists.  Pt completed bed mobility with Mod + Min A, Min A for functional transfers and Min A x2 for mobility 2/2 low vision. Pt required Mod A for LB dressing, Max A for toileting, CGA-Min A for grooming tasks. Pt at EOS left with all needs met, call light in reach, and chair  alarm engaged. Pt would continue to benefit from acute OT at this time to improve

## 2025-05-30 NOTE — PROGRESS NOTES
05/30/25 1051   Encounter Summary   Encounter Overview/Reason Advance Care Planning   Service Provided For Patient and family together   Referral/Consult From Nurse   Support System Spouse   Last Encounter  05/30/25  (Patient has ACP documents.  verifed documents, copied and left copy in patient folder. DS)   Complexity of Encounter Low   Begin Time 1025   End Time  1055   Total Time Calculated 30 min   Advance Care Planning   Type ACP conversation;Completed AD/ACP document(s)   Assessment/Intervention/Outcome   Assessment Calm;Coping   Intervention Active listening   Outcome Connection/Belonging;Expressed Gratitude

## 2025-05-30 NOTE — PROGRESS NOTES
Physical Therapy  Facility/Department: [facility] C3 TELE/MED SURG/ONC  Daily Treatment Note  NAME: Zandra Trammell  : 1950  MRN: [id number]    Date of Service: 2025    Discharge Recommendations:  Subacute/Skilled Nursing Facility   PT Equipment Recommendations  Equipment Needed: No  Other: Defer    Patient Diagnosis(es): The primary encounter diagnosis was Septicemia (HCC). Diagnoses of Nausea and vomiting, unspecified vomiting type, Hepatic encephalopathy (HCC), and Thrombocytopenia were also pertinent to this visit.    Barriers to Home Discharge:   [x] Steps to access home entry or bed/bath:   [x] Unable to transfer, ambulate, or propel wheelchair household distances without assist   [] Limited available assist at home upon discharge    [] Patient or family requests d/c to post-acute facility    [x] Poor cognition/safety awareness for d/c to home alone    [] Unable to maintain ordered weight bearing status    [] Patient with salient signs of long-standing immobility   [x] Decreased independence with ADLs   [x] Increased risk for falls   [] Other:    Assessment  Assessment: Pt with good pariticpation in therapy this date and demos good progress towards goals. Pt completes bed mobility with mod+min Ax2, sit<>stands from the EOB, chair, and toilet with min A, and ambulates to the bathroom and around the room with min Ax2 with bilateral HHA to guide pt around the room and 2nd therapist behind at the trunk guiding and managing lines. Pt will continue to benefit from skilled PT services to address current deficits. Continue to rec SNF at DC when medically appropriate. Co-tx collaboration this date to safely meet goals and will have better occupational performance outcomes with in a co-treatment than 1:1 session.    Activity Tolerance: Patient tolerated treatment well;Treatment limited secondary to decreased cognition  Equipment Needed: No  Other: Defer    Plan  Physical Therapy Plan  General Plan: 3-5 times

## 2025-05-30 NOTE — PROGRESS NOTES
Speech Language Pathology  Clinical Bedside Swallow Assessment  Facility/Department: [facility] C3 TELE/MED SURG/ONC        Recommendations:  Diet recommendation: IDDSI 7 Regular Solids; IDDSI 0 Thin Liquids; Meds PO as tolerated  Instrumentation: Not clinically indicated at this time  Risk management: upright for all intake, alternate bites/sips, and slow rate of intake      NAME:Zandra Trammell  : 1950 (74 y.o.)   MRN: [id number]  ROOM: [address] [place] [address]  ADMISSION DATE: 2025  PATIENT DIAGNOSIS(ES): Hepatic encephalopathy (HCC) [K76.82]  SOB (shortness of breath) [R06.02]  Thrombocytopenia [D69.6]  Septicemia (HCC) [A41.9]  Nausea and vomiting, unspecified vomiting type [R11.2]  Chief Complaint   Patient presents with    Vomiting     Per EMS report pt has been vomiting for the past couple of hours. She has a hx of liver disease with hepatic encephalopathy.  reporting pt was \"fine\" yesterday and had an appt with her \"sleep doctor\" aka neurologist. Patient is legally blind.      Patient Active Problem List    Diagnosis Date Noted    SOB (shortness of breath) 2025     Past Medical History:   Diagnosis Date    Cirrhosis (HCC)     Hepatic encephalopathy (HCC)     Insomnia     Liver disease     Sleep apnea      Past Surgical History:   Procedure Laterality Date    ESOPHAGOGASTRODUODENOSCOPY       Allergies   Allergen Reactions    Codeine Anxiety       DATE ONSET: 2025    Date of Evaluation: 2025   Evaluating Therapist: CHINO MARIA    Chart Reviewed: : [x] Yes [] No     Pain: The patient does not complain of pain     Current Diet: Diet NPO      Most Recent Imaging    XR CHEST PORTABLE   Final Result      Suspected new right basilar airspace disease, possibly atelectasis.      Electronically signed by Lamont Sher DO      CT HEAD WO CONTRAST   Final Result   Impression:       No acute intracranial abnormality or mass effect.      Electronically signed by Bony Damico MD      CT

## 2025-05-30 NOTE — ACP (ADVANCE CARE PLANNING)
Advance Care Planning     Advance Care Planning Inpatient Note  [facility] Department    Today's Date: 5/30/2025  Unit: [place] C3 TELE/MED SURG/ONC    Received request from patient.  Upon review of chart and communication with care team, patient's decision making abilities are not in question.. Patient and Spouse was/were present in the room during visit.    Goals of ACP Conversation:  Discuss advance care planning documents    Health Care Decision Makers:       Primary Decision Maker: Jassi Trammell Dr - Spouse - [phone]    Secondary Decision Maker: Indra Trammell - Step Child - [phone]  Summary:  Verified Documents  Verified Healthcare Decision Maker  Updated Healthcare Decision Maker    Advance Care Planning Documents (Patient Wishes):  Healthcare Power of /Advance Directive Appointment of Health Care Agent  Living Will/Advance Directive  Anatomical Gift/Organ Donation     Assessment:  Patient already had ACP documents.  made copies for hospital and returned originals to patient and spouse.     Interventions:  Requested patient/family to submit existing document for our records: Healthcare Power of /Advance Directive Appointment of Health Care Agent  Living Will/Advance Directive    Care Preferences Communicated:   No    Outcomes/Plan:  Existing advance directive reviewed with patient; no changes to patient's previously recorded wishes.  Returned original document(s) to patient, as well as copies for distribution to appointed agents  Copy of advance directive given to staff to scan into medical record.    Electronically signed by Chaplain Becca on 5/30/2025 at 10:58 AM

## 2025-05-30 NOTE — PROGRESS NOTES
Patient had an episode of severe coughing after eating at right before the beginning of this shift. Patients oxygen levels dropped into upper 80s and was placed on 4 L of oxygen. Respirations went up to 40s. MD was notofied by the previous nurse that had the patient during the day. New orders received. RN held nighttime oral meds as the patient is high risk for aspiration. MD was notified about the initiated NPO status by the RN. Patient has improved as the shift went by. Patient is currently on 2L of oxygen. Respiration rate have improved into higher 20s.

## 2025-05-31 LAB
ANION GAP SERPL CALCULATED.3IONS-SCNC: 7 MMOL/L (ref 3–16)
BASOPHILS # BLD: 0 K/UL (ref 0–0.2)
BASOPHILS NFR BLD: 0.8 %
BUN SERPL-MCNC: 11 MG/DL (ref 7–20)
CALCIUM SERPL-MCNC: 8.3 MG/DL (ref 8.3–10.6)
CHLORIDE SERPL-SCNC: 111 MMOL/L (ref 99–110)
CO2 SERPL-SCNC: 21 MMOL/L (ref 21–32)
CREAT SERPL-MCNC: 0.6 MG/DL (ref 0.6–1.2)
DEPRECATED RDW RBC AUTO: 12.7 % (ref 12.4–15.4)
EOSINOPHIL # BLD: 0.2 K/UL (ref 0–0.6)
EOSINOPHIL NFR BLD: 7.9 %
GFR SERPLBLD CREATININE-BSD FMLA CKD-EPI: >90 ML/MIN/{1.73_M2}
GLUCOSE SERPL-MCNC: 94 MG/DL (ref 70–99)
HCT VFR BLD AUTO: 32.4 % (ref 36–48)
HGB BLD-MCNC: 11.1 G/DL (ref 12–16)
LACTATE BLDV-SCNC: 1.3 MMOL/L (ref 0.4–2)
LACTATE BLDV-SCNC: 1.5 MMOL/L (ref 0.4–2)
LYMPHOCYTES # BLD: 0.6 K/UL (ref 1–5.1)
LYMPHOCYTES NFR BLD: 22.7 %
MAGNESIUM SERPL-MCNC: 1.89 MG/DL (ref 1.8–2.4)
MCH RBC QN AUTO: 32.4 PG (ref 26–34)
MCHC RBC AUTO-ENTMCNC: 34.3 G/DL (ref 31–36)
MCV RBC AUTO: 94.3 FL (ref 80–100)
MONOCYTES # BLD: 0.6 K/UL (ref 0–1.3)
MONOCYTES NFR BLD: 20.6 %
NEUTROPHILS # BLD: 1.4 K/UL (ref 1.7–7.7)
NEUTROPHILS NFR BLD: 48 %
PLATELET # BLD AUTO: 53 K/UL (ref 135–450)
PMV BLD AUTO: 9.8 FL (ref 5–10.5)
POTASSIUM SERPL-SCNC: 3.1 MMOL/L (ref 3.5–5.1)
RBC # BLD AUTO: 3.44 M/UL (ref 4–5.2)
SODIUM SERPL-SCNC: 139 MMOL/L (ref 136–145)
WBC # BLD AUTO: 2.8 K/UL (ref 4–11)

## 2025-05-31 PROCEDURE — 6370000000 HC RX 637 (ALT 250 FOR IP): Performed by: NURSE PRACTITIONER

## 2025-05-31 PROCEDURE — 94761 N-INVAS EAR/PLS OXIMETRY MLT: CPT

## 2025-05-31 PROCEDURE — 80048 BASIC METABOLIC PNL TOTAL CA: CPT

## 2025-05-31 PROCEDURE — 2500000003 HC RX 250 WO HCPCS

## 2025-05-31 PROCEDURE — 99223 1ST HOSP IP/OBS HIGH 75: CPT | Performed by: STUDENT IN AN ORGANIZED HEALTH CARE EDUCATION/TRAINING PROGRAM

## 2025-05-31 PROCEDURE — 6370000000 HC RX 637 (ALT 250 FOR IP)

## 2025-05-31 PROCEDURE — 6360000002 HC RX W HCPCS

## 2025-05-31 PROCEDURE — 2700000000 HC OXYGEN THERAPY PER DAY

## 2025-05-31 PROCEDURE — 85025 COMPLETE CBC W/AUTO DIFF WBC: CPT

## 2025-05-31 PROCEDURE — 83735 ASSAY OF MAGNESIUM: CPT

## 2025-05-31 PROCEDURE — 2580000003 HC RX 258

## 2025-05-31 PROCEDURE — 1200000000 HC SEMI PRIVATE

## 2025-05-31 PROCEDURE — 36415 COLL VENOUS BLD VENIPUNCTURE: CPT

## 2025-05-31 PROCEDURE — 83605 ASSAY OF LACTIC ACID: CPT

## 2025-05-31 RX ORDER — POTASSIUM CHLORIDE 7.45 MG/ML
10 INJECTION INTRAVENOUS
Status: COMPLETED | OUTPATIENT
Start: 2025-05-31 | End: 2025-05-31

## 2025-05-31 RX ADMIN — ACETAMINOPHEN 650 MG: 325 TABLET ORAL at 08:10

## 2025-05-31 RX ADMIN — SODIUM CHLORIDE, SODIUM LACTATE, POTASSIUM CHLORIDE, AND CALCIUM CHLORIDE: .6; .31; .03; .02 INJECTION, SOLUTION INTRAVENOUS at 01:06

## 2025-05-31 RX ADMIN — PANTOPRAZOLE SODIUM 40 MG: 40 TABLET, DELAYED RELEASE ORAL at 06:03

## 2025-05-31 RX ADMIN — ENOXAPARIN SODIUM 40 MG: 100 INJECTION SUBCUTANEOUS at 08:02

## 2025-05-31 RX ADMIN — RIFAXIMIN 400 MG: 200 TABLET ORAL at 21:14

## 2025-05-31 RX ADMIN — LACTULOSE 20 G: 20 SOLUTION ORAL at 21:14

## 2025-05-31 RX ADMIN — SODIUM CHLORIDE, PRESERVATIVE FREE 10 ML: 5 INJECTION INTRAVENOUS at 11:22

## 2025-05-31 RX ADMIN — PROPRANOLOL HYDROCHLORIDE 60 MG: 40 TABLET ORAL at 11:20

## 2025-05-31 RX ADMIN — LACTULOSE 20 G: 20 SOLUTION ORAL at 08:02

## 2025-05-31 RX ADMIN — POTASSIUM CHLORIDE 10 MEQ: 7.46 INJECTION, SOLUTION INTRAVENOUS at 13:23

## 2025-05-31 RX ADMIN — RIFAXIMIN 400 MG: 200 TABLET ORAL at 08:02

## 2025-05-31 RX ADMIN — POTASSIUM CHLORIDE 10 MEQ: 7.46 INJECTION, SOLUTION INTRAVENOUS at 14:26

## 2025-05-31 RX ADMIN — LACTULOSE 20 G: 20 SOLUTION ORAL at 12:11

## 2025-05-31 RX ADMIN — POTASSIUM CHLORIDE 20 MEQ: 1500 TABLET, EXTENDED RELEASE ORAL at 08:02

## 2025-05-31 RX ADMIN — BUPROPION HYDROCHLORIDE 150 MG: 150 TABLET, EXTENDED RELEASE ORAL at 08:02

## 2025-05-31 RX ADMIN — POTASSIUM CHLORIDE 10 MEQ: 7.46 INJECTION, SOLUTION INTRAVENOUS at 12:13

## 2025-05-31 RX ADMIN — RIFAXIMIN 400 MG: 200 TABLET ORAL at 12:11

## 2025-05-31 RX ADMIN — POTASSIUM CHLORIDE 10 MEQ: 7.46 INJECTION, SOLUTION INTRAVENOUS at 15:32

## 2025-05-31 ASSESSMENT — PAIN DESCRIPTION - LOCATION: LOCATION: HEAD

## 2025-05-31 ASSESSMENT — PAIN SCALES - GENERAL: PAINLEVEL_OUTOF10: 6

## 2025-05-31 ASSESSMENT — PAIN DESCRIPTION - DESCRIPTORS: DESCRIPTORS: ACHING;CRUSHING

## 2025-05-31 ASSESSMENT — PAIN DESCRIPTION - ORIENTATION: ORIENTATION: RIGHT;LEFT;MID

## 2025-05-31 NOTE — PROGRESS NOTES
A/Ox3 with intermittent confusion. Called regarding pain over PIV site described as burning sensation. Thought the BP cuff/temperature cuff was still on and asked to be checked. RN informed it's potassium infusion and patient recalled us discussing possible side effects related to PIV admin. Patient otherwise agreeable and pleasant throughout shift. [facility]

## 2025-05-31 NOTE — PROGRESS NOTES
Hospital Medicine Progress Note  V 5.17      Date of Admission: 5/28/2025    Hospital Day: 4      Chief Admission Complaint:  Vomiting (Per EMS report pt has been vomiting for the past couple of hours. She has a hx of liver disease with hepatic encephalopathy.  reporting pt was \"fine\" yesterday and had an appt with her \"sleep doctor\" aka neurologist. Patient is legally blind. )    Subjective:      Patient was evaluated at bedside this morning.  Denies any acute concerns.  States that she is feeling well overall.   in the room does state that her mental status is fluctuating.    Presenting Admission History:       \" 74 y.o. female who presented to [facility] with Nausea, vomiting, weakness, couldn't walk.  PMHx significant for Anxiety, cirrhosis, HTN. Patient seen at bedside on the morning of admission. Patient mentions ongoing nausea, vomiting, weakness and trouble walking ongoing for past day. Patient also mentions ongoing intermittent fevers with dry cough. Describes abdominal pain 5/10 in severity in middle of abdomen. Patient has some trouble with vision. No hematochezia, hematemesis, pain anywhere else, sob, hypoxic events. Patient at home was able to complete ADLs independently. \"       Assessment/Plan:      Sepsis  - Concerns on admission with fever, tachycardia, elevated lactic acid  - Source concerning for possible aspiration pneumonia versus gastric  - Initially started on Vanco and ceftriaxone, however due to patient not having any obvious source of infection antibiotics were discontinued    Cryptogenic cirrhosis with ulcerative colitis  - Was not taking treatment prior to arrival  - GI following, encouraged on starting and continue rifaximin and propranolol  - Will also continue lactulose with diet    Acute encephalopathy  Altered mental status  - Unlikely related to hepatic encephalopathy per GI  - CT head negative  - MRI without any acute findings  - Ammonia levels

## 2025-05-31 NOTE — PLAN OF CARE
Problem: Safety - Adult  Goal: Free from fall injury  5/30/2025 2252 by Nena Mcarthur, RN  Flowsheets (Taken 5/30/2025 2252)  Free From Fall Injury:   Instruct family/caregiver on patient safety   Based on caregiver fall risk screen, instruct family/caregiver to ask for assistance with transferring infant if caregiver noted to have fall risk factors  5/30/2025 1314 by Taylor Sheffield, RN  Outcome: Progressing  Flowsheets (Taken 5/30/2025 1314)  Free From Fall Injury: Instruct family/caregiver on patient safety

## 2025-05-31 NOTE — CONSULTS
Inpatient Neurology Consult  [facility]  Jesús Jensen MD    Zandra Trammell  1950    Date of Service: 5/31/2025    Referring Physician: Prosper Rivera MD    Reason for the consult and CC: altered mental status, walking     HPI:   Zandra Trammell is a 74 y.o. female who  has a past medical history of Cirrhosis (HCC), Crohn disease (HCC), Hepatic encephalopathy (HCC), Insomnia, Portal hypertension (HCC), and Sleep apnea. Admitted for inability to walk and severe sepsis in the setting of cryptogenic cirrhosis. Known history of hepatic encephalopathy but GI consult assessment is atypical features of hepatic encephalopathy. Diagnosis later revised to acute respiratory failure with hypoxia possibly due to aspiration, sepsis of unknown source, suspect acute gastroenteritis.     Last fever was 5/29 evening.     She reports that she has fluctuating confusion from time to time for perhaps a few months. She has experienced auditory hallucinations like someone is yelling only during hospitalization. She has a tremor for about 1 year.     I called her . He reports that she does have some mild confusion at baseline. However, during this hospitalization associated with fever she has spells of severe confusion, slurred speech, auditory hallucinations, and abnormal movements described as thrashing and akin to restless legs syndrome. In between spells, she is back to her normal mildly confused self. This has occurred multiple times during hospitalization but never before hospitalization. No known history of seizure.     I personally reviewed and updated social history, past medical history, medications, allergy, surgical history, and family history as documented in the patient's electronic health records.     Physical Examination  Mental Status:   Alert   Oriented to time, place, and person   Recent memory mildly impaired  Attention slightly impaired   Language expression and comprehension normal

## 2025-05-31 NOTE — PROGRESS NOTES
Awake on bed, alert and oriented x  3, disoriented  to time. On oxygen at 2 lpm nasal cannula. IV on right forearm. Reports headache 7/10, PRN Tylenol administered. Side rails x 3. Call light within reach. Bed alarm on. Will continue to monitor.

## 2025-05-31 NOTE — PROGRESS NOTES
PROGRESS NOTE  S:74 yrs Patient  admitted on 5/28/2025 with Hepatic encephalopathy (HCC) [K76.82]  SOB (shortness of breath) [R06.02]  Thrombocytopenia [D69.6]  Septicemia (HCC) [A41.9]  Nausea and vomiting, unspecified vomiting type [R11.2] .    Patient is somnolent bur arousable, AAOx3. No asterixis. Per her  she is experiencing mental status fluctuations here and at home.     Current Hospital Schedued Meds   magnesium sulfate  2,000 mg IntraVENous Once    potassium chloride  20 mEq Oral Daily with breakfast    acetaminophen  650 mg Oral Once    sodium chloride flush  5-40 mL IntraVENous 2 times per day    enoxaparin  40 mg SubCUTAneous Daily    buPROPion  150 mg Oral Daily    propranolol  60 mg Oral Daily    pantoprazole  40 mg Oral QAM AC    rifAXIMin  400 mg Oral TID    lactulose  20 g Oral TID     Current Hospital IV Meds   sodium chloride      lactated ringers 100 mL/hr at 05/31/25 0106     Current Hospital PRN Meds  sodium chloride flush, sodium chloride, potassium chloride **OR** potassium alternative oral replacement **OR** potassium chloride, magnesium sulfate, ondansetron **OR** ondansetron, polyethylene glycol, acetaminophen **OR** acetaminophen, albuterol, guaiFENesin-dextromethorphan, prochlorperazine    Exam:   Vitals:    05/31/25 0800   BP: 134/70   Pulse: 74   Resp: 16   Temp: 97.3 °F (36.3 °C)   SpO2: 97%     I/O last 3 completed shifts:  In: -   Out: 1250 [Urine:1250]  A medically necessary and appropriate physical exam was performed. Somnolent adult female. AAOx3.     Labs:  CBC:   Recent Labs     05/29/25  0705 05/30/25  0541 05/31/25  0130   WBC 5.4 3.0* 2.8*   HGB 12.6 11.8* 11.1*   HCT 36.2 32.8* 32.4*   MCV 93.2 93.7 94.3   PLT 88* 49* 53*     BMP:   Recent Labs     05/29/25  0502 05/30/25  0541 05/31/25  0130    140 139   K 3.7 3.7 3.1*    111* 111*   CO2 15* 20* 21   PHOS 3.3  --   --    BUN 14 12 11   CREATININE 0.9 0.7 0.6

## 2025-05-31 NOTE — PROGRESS NOTES
Consult Placed via perfect serve    Who: Dr. Jensen  Date:05/31/25  Time: 11:17     Electronically signed by Cathy Singh on 5/31/2025 at 11:17 AM

## 2025-06-01 LAB
AMMONIA PLAS-SCNC: 41 UMOL/L (ref 11–51)
ANION GAP SERPL CALCULATED.3IONS-SCNC: 9 MMOL/L (ref 3–16)
BACTERIA BLD CULT ORG #2: NORMAL
BACTERIA BLD CULT: NORMAL
BUN SERPL-MCNC: 10 MG/DL (ref 7–20)
CALCIUM SERPL-MCNC: 8 MG/DL (ref 8.3–10.6)
CHLORIDE SERPL-SCNC: 113 MMOL/L (ref 99–110)
CO2 SERPL-SCNC: 20 MMOL/L (ref 21–32)
CREAT SERPL-MCNC: 0.7 MG/DL (ref 0.6–1.2)
DEPRECATED RDW RBC AUTO: 12.6 % (ref 12.4–15.4)
GFR SERPLBLD CREATININE-BSD FMLA CKD-EPI: 89 ML/MIN/{1.73_M2}
GLUCOSE SERPL-MCNC: 81 MG/DL (ref 70–99)
HCT VFR BLD AUTO: 32.4 % (ref 36–48)
HGB BLD-MCNC: 11.3 G/DL (ref 12–16)
MAGNESIUM SERPL-MCNC: 1.84 MG/DL (ref 1.8–2.4)
MCH RBC QN AUTO: 32.7 PG (ref 26–34)
MCHC RBC AUTO-ENTMCNC: 35 G/DL (ref 31–36)
MCV RBC AUTO: 93.4 FL (ref 80–100)
PLATELET # BLD AUTO: 64 K/UL (ref 135–450)
PMV BLD AUTO: 8.6 FL (ref 5–10.5)
POTASSIUM SERPL-SCNC: 3.4 MMOL/L (ref 3.5–5.1)
RBC # BLD AUTO: 3.47 M/UL (ref 4–5.2)
SODIUM SERPL-SCNC: 142 MMOL/L (ref 136–145)
WBC # BLD AUTO: 3.9 K/UL (ref 4–11)

## 2025-06-01 PROCEDURE — 6370000000 HC RX 637 (ALT 250 FOR IP)

## 2025-06-01 PROCEDURE — 6370000000 HC RX 637 (ALT 250 FOR IP): Performed by: NURSE PRACTITIONER

## 2025-06-01 PROCEDURE — 82140 ASSAY OF AMMONIA: CPT

## 2025-06-01 PROCEDURE — 6360000002 HC RX W HCPCS

## 2025-06-01 PROCEDURE — 83735 ASSAY OF MAGNESIUM: CPT

## 2025-06-01 PROCEDURE — 85027 COMPLETE CBC AUTOMATED: CPT

## 2025-06-01 PROCEDURE — 51798 US URINE CAPACITY MEASURE: CPT

## 2025-06-01 PROCEDURE — 80048 BASIC METABOLIC PNL TOTAL CA: CPT

## 2025-06-01 PROCEDURE — 51701 INSERT BLADDER CATHETER: CPT

## 2025-06-01 PROCEDURE — 2580000003 HC RX 258

## 2025-06-01 PROCEDURE — 1200000000 HC SEMI PRIVATE

## 2025-06-01 PROCEDURE — 36415 COLL VENOUS BLD VENIPUNCTURE: CPT

## 2025-06-01 RX ORDER — POTASSIUM CHLORIDE 7.45 MG/ML
10 INJECTION INTRAVENOUS
Status: COMPLETED | OUTPATIENT
Start: 2025-06-01 | End: 2025-06-01

## 2025-06-01 RX ADMIN — ACETAMINOPHEN 650 MG: 325 TABLET ORAL at 02:58

## 2025-06-01 RX ADMIN — RIFAXIMIN 400 MG: 200 TABLET ORAL at 15:11

## 2025-06-01 RX ADMIN — POTASSIUM CHLORIDE 10 MEQ: 7.46 INJECTION, SOLUTION INTRAVENOUS at 09:00

## 2025-06-01 RX ADMIN — LACTULOSE 20 G: 20 SOLUTION ORAL at 08:59

## 2025-06-01 RX ADMIN — PANTOPRAZOLE SODIUM 40 MG: 40 TABLET, DELAYED RELEASE ORAL at 06:08

## 2025-06-01 RX ADMIN — PROPRANOLOL HYDROCHLORIDE 60 MG: 40 TABLET ORAL at 08:58

## 2025-06-01 RX ADMIN — POTASSIUM CHLORIDE 10 MEQ: 7.46 INJECTION, SOLUTION INTRAVENOUS at 10:03

## 2025-06-01 RX ADMIN — POTASSIUM CHLORIDE 10 MEQ: 7.46 INJECTION, SOLUTION INTRAVENOUS at 11:29

## 2025-06-01 RX ADMIN — LACTULOSE 20 G: 20 SOLUTION ORAL at 19:28

## 2025-06-01 RX ADMIN — RIFAXIMIN 400 MG: 200 TABLET ORAL at 21:45

## 2025-06-01 RX ADMIN — RIFAXIMIN 400 MG: 200 TABLET ORAL at 08:58

## 2025-06-01 RX ADMIN — POTASSIUM CHLORIDE 10 MEQ: 7.46 INJECTION, SOLUTION INTRAVENOUS at 12:34

## 2025-06-01 RX ADMIN — SODIUM CHLORIDE, SODIUM LACTATE, POTASSIUM CHLORIDE, AND CALCIUM CHLORIDE: .6; .31; .03; .02 INJECTION, SOLUTION INTRAVENOUS at 06:11

## 2025-06-01 RX ADMIN — LACTULOSE 20 G: 20 SOLUTION ORAL at 15:11

## 2025-06-01 ASSESSMENT — PAIN DESCRIPTION - ORIENTATION: ORIENTATION: ANTERIOR

## 2025-06-01 ASSESSMENT — PAIN SCALES - GENERAL
PAINLEVEL_OUTOF10: 0
PAINLEVEL_OUTOF10: 6

## 2025-06-01 ASSESSMENT — PAIN - FUNCTIONAL ASSESSMENT: PAIN_FUNCTIONAL_ASSESSMENT: ACTIVITIES ARE NOT PREVENTED

## 2025-06-01 ASSESSMENT — PAIN DESCRIPTION - DESCRIPTORS: DESCRIPTORS: ACHING

## 2025-06-01 ASSESSMENT — PAIN DESCRIPTION - LOCATION: LOCATION: HEAD

## 2025-06-01 NOTE — PROGRESS NOTES
Pt with ~800mL urine, after urination patient's scan showed ~500mL. MD states bladder scan q6 and straight cath >350mL. Straight cath now with Camila MCDOWELL.

## 2025-06-01 NOTE — PROGRESS NOTES
Hospital Medicine Progress Note  V 5.17      Date of Admission: 5/28/2025    Hospital Day: 5      Chief Admission Complaint:  Vomiting (Per EMS report pt has been vomiting for the past couple of hours. She has a hx of liver disease with hepatic encephalopathy.  reporting pt was \"fine\" yesterday and had an appt with her \"sleep doctor\" aka neurologist. Patient is legally blind. )    Subjective:      Patient was evaluated at bedside this morning.  Denies any acute concerns.  She is feeling well overall but her neck is a little sore that she attributes to the bed.  She denies any vomiting or abdominal pain at this time.  She denies any burning with urination though does endorse some tingling.    Presenting Admission History:       \" 74 y.o. female who presented to [facility] with Nausea, vomiting, weakness, couldn't walk.  PMHx significant for Anxiety, cirrhosis, HTN. Patient seen at bedside on the morning of admission. Patient mentions ongoing nausea, vomiting, weakness and trouble walking ongoing for past day. Patient also mentions ongoing intermittent fevers with dry cough. Describes abdominal pain 5/10 in severity in middle of abdomen. Patient has some trouble with vision. No hematochezia, hematemesis, pain anywhere else, sob, hypoxic events. Patient at home was able to complete ADLs independently. \"       Assessment/Plan:      Sepsis  - Concerns on admission with fever, tachycardia, elevated lactic acid  - Source concerning for possible aspiration pneumonia versus gastric  - Initially started on Vanco and ceftriaxone, however due to patient not having any obvious source of infection antibiotics were discontinued  - Lactic acid resolved 5/31    Cryptogenic cirrhosis with ulcerative colitis  - Was not taking treatment prior to arrival  - GI following, encouraged on starting and continue rifaximin and propranolol  - Will also continue lactulose with diet    Acute encephalopathy  Altered

## 2025-06-01 NOTE — CONSULTS
Consult Call Back    Who:Chip Jensen MD   Date:6/1/2025,  Time:7:55 AM    Electronically signed by Joy Sloan on 6/1/25 at 7:55 AM EDT

## 2025-06-01 NOTE — PROGRESS NOTES
PROGRESS NOTE  S:74 yrs Patient  admitted on 5/28/2025 with Hepatic encephalopathy (HCC) [K76.82]  SOB (shortness of breath) [R06.02]  Thrombocytopenia [D69.6]  Septicemia (HCC) [A41.9]  Nausea and vomiting, unspecified vomiting type [R11.2] .    Mental status appears much improved today. AAOx3. Denies nausea.     Current Hospital Schedued Meds   potassium chloride  10 mEq IntraVENous Q1H    magnesium sulfate  2,000 mg IntraVENous Once    acetaminophen  650 mg Oral Once    sodium chloride flush  5-40 mL IntraVENous 2 times per day    [Held by provider] enoxaparin  40 mg SubCUTAneous Daily    [Held by provider] buPROPion  150 mg Oral Daily    propranolol  60 mg Oral Daily    pantoprazole  40 mg Oral QAM AC    rifAXIMin  400 mg Oral TID    lactulose  20 g Oral TID     Current Hospital IV Meds   sodium chloride      lactated ringers 50 mL/hr at 06/01/25 0611     Current Hospital PRN Meds  sodium chloride flush, sodium chloride, magnesium sulfate, ondansetron **OR** ondansetron, polyethylene glycol, acetaminophen **OR** acetaminophen, albuterol, guaiFENesin-dextromethorphan, [Held by provider] prochlorperazine    Exam:   Vitals:    06/01/25 0900   BP: (!) 146/73   Pulse: 70   Resp: 16   Temp: 97.3 °F (36.3 °C)   SpO2: 94%     I/O last 3 completed shifts:  In: -   Out: 1100 [Urine:1100]  A medically necessary and appropriate physical exam was performed. No asterixis.     Labs:  CBC:   Recent Labs     05/30/25  0541 05/31/25  0130 06/01/25  0532   WBC 3.0* 2.8* 3.9*   HGB 11.8* 11.1* 11.3*   HCT 32.8* 32.4* 32.4*   MCV 93.7 94.3 93.4   PLT 49* 53* 64*     BMP:   Recent Labs     05/30/25  0541 05/31/25  0130 06/01/25  0532    139 142   K 3.7 3.1* 3.4*   * 111* 113*   CO2 20* 21 20*   BUN 12 11 10   CREATININE 0.7 0.6 0.7     LIVER PROFILE: No results for input(s): \"AST\", \"ALT\", \"LIPASE\", \"AMYLASE\", \"BILIDIR\", \"BILITOT\", \"ALKPHOS\" in the last 72 hours.    Invalid input(s):

## 2025-06-01 NOTE — PROGRESS NOTES
RN showered, dressed, and changed patient's linens. Patient back resting in bed. Aid will be in to finish washing hair. No further needs at this time. Patient declined purewick.

## 2025-06-01 NOTE — PLAN OF CARE
Problem: Safety - Adult  Goal: Free from fall injury  Outcome: Progressing  Flowsheets (Taken 5/31/2025 2100)  Free From Fall Injury:   Instruct family/caregiver on patient safety   Based on caregiver fall risk screen, instruct family/caregiver to ask for assistance with transferring infant if caregiver noted to have fall risk factors

## 2025-06-01 NOTE — PROGRESS NOTES
Patient alert and oriented x 4.On room air. IV on right forearm. Denies pain. Side rails x 2. On Avasure. Spouse at bedside. Denies further needs.  Will continue to monitor.   Referred by: Jay Mejia MD; Medical Diagnosis (from order):    Diagnosis Information      Diagnosis    735.4 (ICD-9-CM) - M20.41, M20.42 (ICD-10-CM) - Acquired hammertoes of both feet    735.0 (ICD-9-CM) - M20.11, M20.12 (ICD-10-CM) - Valgus deformity of both great toes    715.17 (ICD-9-CM) - M19.072 (ICD-10-CM) - Primary osteoarthritis of left foot    250.00 (ICD-9-CM) - E11.9 (ICD-10-CM) - Type 2 diabetes mellitus without complication, without long-term current use of insulin (CMS/[facility])    754.69 (ICD-9-CM) - Q66.6 (ICD-10-CM) - Pes planovalgus                Physical Therapy -  Daily Treatment Note    Visit:  4     SUBJECTIVE                                                                                                             Has been able to wear a sock for 3 hours.  She was able to wear a regular shoe for a few minutes.       Pain / Symptoms:  Pain rating (out of 10): Current: 0     OBJECTIVE                                                                                                                          TREATMENT                                                                                                                  Therapeutic Exercise:  Discussed home exercise program and current symptoms  PROM of toes into flexion and extension   PROM of ankle  Standing weight shifts in gait position  Toe extension, flexion and splaying  Mini squats with counter support  Bike for 5 minutes 6 minutes  Neuromuscular Re-Education:  Desensitization to dorsal and plantar aspect of foot and toes with patient's sock  Romberg stance with eyes closed on airex foam  Modified tandem stance with eyes closed on airex foam   Rocker board M-L A-P taps     Skilled input: verbal instruction/cues and tactile instruction/cues    Writer verbally educated and received verbal consent for hand placement, positioning of patient, and techniques to be performed today from patient for clothing adjustments for techniques as  described above and how they are pertinent to the patient's plan of care.    Home Exercise Program: (*above indicates provided as part of home exercise program)  Access Code: [email or web address]   URL: [email or web address]   Date: 06/10/2020   Prepared by: Guillermo Thomas     Exercises  Seated Heel Raise - 10 reps - 3x daily - 7x weekly  Stride Stance Weight Shift - 10 reps - 2x daily - 7x weekly      ASSESSMENT                                                                                                             Ny continues to improve with activity tolerance and was able to put on a shoe for the first time since surgery this week.        Procedures and total treatment time documented Time Entry flowsheet.

## 2025-06-02 LAB
ANION GAP SERPL CALCULATED.3IONS-SCNC: 9 MMOL/L (ref 3–16)
BUN SERPL-MCNC: 9 MG/DL (ref 7–20)
CALCIUM SERPL-MCNC: 8.5 MG/DL (ref 8.3–10.6)
CHLORIDE SERPL-SCNC: 112 MMOL/L (ref 99–110)
CO2 SERPL-SCNC: 21 MMOL/L (ref 21–32)
CREAT SERPL-MCNC: 0.6 MG/DL (ref 0.6–1.2)
DEPRECATED RDW RBC AUTO: 12.8 % (ref 12.4–15.4)
GFR SERPLBLD CREATININE-BSD FMLA CKD-EPI: >90 ML/MIN/{1.73_M2}
GLUCOSE SERPL-MCNC: 81 MG/DL (ref 70–99)
HCT VFR BLD AUTO: 33.8 % (ref 36–48)
HGB BLD-MCNC: 11.8 G/DL (ref 12–16)
MAGNESIUM SERPL-MCNC: 1.76 MG/DL (ref 1.8–2.4)
MCH RBC QN AUTO: 32.6 PG (ref 26–34)
MCHC RBC AUTO-ENTMCNC: 35.1 G/DL (ref 31–36)
MCV RBC AUTO: 92.9 FL (ref 80–100)
PLATELET # BLD AUTO: 88 K/UL (ref 135–450)
PMV BLD AUTO: 8.6 FL (ref 5–10.5)
POTASSIUM SERPL-SCNC: 3.3 MMOL/L (ref 3.5–5.1)
RBC # BLD AUTO: 3.64 M/UL (ref 4–5.2)
SODIUM SERPL-SCNC: 142 MMOL/L (ref 136–145)
VIT B1 SERPL-MCNC: 4 NMOL/L (ref 4–15)
WBC # BLD AUTO: 5.3 K/UL (ref 4–11)

## 2025-06-02 PROCEDURE — 85027 COMPLETE CBC AUTOMATED: CPT

## 2025-06-02 PROCEDURE — 6360000002 HC RX W HCPCS

## 2025-06-02 PROCEDURE — 83735 ASSAY OF MAGNESIUM: CPT

## 2025-06-02 PROCEDURE — 97116 GAIT TRAINING THERAPY: CPT

## 2025-06-02 PROCEDURE — 6370000000 HC RX 637 (ALT 250 FOR IP): Performed by: NURSE PRACTITIONER

## 2025-06-02 PROCEDURE — 95819 EEG AWAKE AND ASLEEP: CPT

## 2025-06-02 PROCEDURE — 2580000003 HC RX 258

## 2025-06-02 PROCEDURE — 80048 BASIC METABOLIC PNL TOTAL CA: CPT

## 2025-06-02 PROCEDURE — 6370000000 HC RX 637 (ALT 250 FOR IP)

## 2025-06-02 PROCEDURE — 97530 THERAPEUTIC ACTIVITIES: CPT

## 2025-06-02 PROCEDURE — APPSS30 APP SPLIT SHARED TIME 16-30 MINUTES

## 2025-06-02 PROCEDURE — 97110 THERAPEUTIC EXERCISES: CPT

## 2025-06-02 PROCEDURE — 97535 SELF CARE MNGMENT TRAINING: CPT

## 2025-06-02 PROCEDURE — 1200000000 HC SEMI PRIVATE

## 2025-06-02 RX ORDER — MAGNESIUM SULFATE IN WATER 40 MG/ML
2000 INJECTION, SOLUTION INTRAVENOUS ONCE
Status: COMPLETED | OUTPATIENT
Start: 2025-06-02 | End: 2025-06-02

## 2025-06-02 RX ADMIN — LACTULOSE 20 G: 20 SOLUTION ORAL at 09:31

## 2025-06-02 RX ADMIN — ACETAMINOPHEN 650 MG: 325 TABLET ORAL at 21:22

## 2025-06-02 RX ADMIN — RIFAXIMIN 400 MG: 200 TABLET ORAL at 14:36

## 2025-06-02 RX ADMIN — MAGNESIUM SULFATE HEPTAHYDRATE 2000 MG: 40 INJECTION, SOLUTION INTRAVENOUS at 09:38

## 2025-06-02 RX ADMIN — RIFAXIMIN 400 MG: 200 TABLET ORAL at 21:20

## 2025-06-02 RX ADMIN — SODIUM CHLORIDE, SODIUM LACTATE, POTASSIUM CHLORIDE, AND CALCIUM CHLORIDE: .6; .31; .03; .02 INJECTION, SOLUTION INTRAVENOUS at 21:40

## 2025-06-02 RX ADMIN — PROPRANOLOL HYDROCHLORIDE 60 MG: 40 TABLET ORAL at 09:31

## 2025-06-02 RX ADMIN — LACTULOSE 20 G: 20 SOLUTION ORAL at 21:20

## 2025-06-02 RX ADMIN — PANTOPRAZOLE SODIUM 40 MG: 40 TABLET, DELAYED RELEASE ORAL at 05:42

## 2025-06-02 RX ADMIN — RIFAXIMIN 400 MG: 200 TABLET ORAL at 09:31

## 2025-06-02 RX ADMIN — LACTULOSE 20 G: 20 SOLUTION ORAL at 14:37

## 2025-06-02 ASSESSMENT — PAIN - FUNCTIONAL ASSESSMENT: PAIN_FUNCTIONAL_ASSESSMENT: ACTIVITIES ARE NOT PREVENTED

## 2025-06-02 ASSESSMENT — PAIN DESCRIPTION - LOCATION: LOCATION: HEAD

## 2025-06-02 ASSESSMENT — PAIN SCALES - GENERAL
PAINLEVEL_OUTOF10: 3
PAINLEVEL_OUTOF10: 0
PAINLEVEL_OUTOF10: 5

## 2025-06-02 ASSESSMENT — PAIN DESCRIPTION - DESCRIPTORS: DESCRIPTORS: POUNDING;ACHING;DISCOMFORT

## 2025-06-02 NOTE — PLAN OF CARE
Problem: Safety - Adult  Goal: Free from fall injury  Outcome: Progressing  Flowsheets (Taken 6/1/2025 2008 by Nena Mcarthur, RN)  Free From Fall Injury:   Instruct family/caregiver on patient safety   Based on caregiver fall risk screen, instruct family/caregiver to ask for assistance with transferring infant if caregiver noted to have fall risk factors     Problem: Skin/Tissue Integrity  Goal: Skin integrity remains intact  Description: 1.  Monitor for areas of redness and/or skin breakdown2.  Assess vascular access sites hourly3.  Every 4-6 hours minimum:  Change oxygen saturation probe site4.  Every 4-6 hours:  If on nasal continuous positive airway pressure, respiratory therapy assess nares and determine need for appliance change or resting period  Outcome: Progressing  Flowsheets (Taken 6/1/2025 0145 by Nena Mcarthur, RN)  Skin Integrity Remains Intact: Monitor for areas of redness and/or skin breakdown     Problem: Pain  Goal: Verbalizes/displays adequate comfort level or baseline comfort level  Outcome: Progressing  Flowsheets (Taken 6/2/2025 1050)  Verbalizes/displays adequate comfort level or baseline comfort level:   Encourage patient to monitor pain and request assistance   Assess pain using appropriate pain scale   Consider cultural and social influences on pain and pain management   Administer analgesics based on type and severity of pain and evaluate response   Implement non-pharmacological measures as appropriate and evaluate response

## 2025-06-02 NOTE — PROGRESS NOTES
Occupational Therapy  Facility/Department: [facility] C3 TELE/MED SURG/ONC  Daily Treatment Note  NAME: Zandra Trammell  : 1950  MRN: [id number]    Date of Service: 2025    Discharge Recommendations:  Subacute/Skilled Nursing Facility  OT Equipment Recommendations  Equipment Needed: No  Other: defer    Therapy discharge recommendations are subject to collaboration from the patient’s interdisciplinary healthcare team, including MD and case management recommendations.     Barriers to Home Discharge:   [x] Steps to access home entry or bed/bath:   [x] Unable to transfer, ambulate, or propel wheelchair household distances without assist   [] Limited available assist at home upon discharge    [] Patient or family requests d/c to post-acute facility    [x] Poor cognition/safety awareness for d/c to home alone    [] Unable to maintain ordered weight bearing status    [] Patient with salient signs of long-standing immobility   [x] Decreased independence with ADLs   [x] Increased risk for falls   [] Other:     If pt is unable to be seen after this session, please let this note serve as discharge summary.  Please see case management note for discharge disposition.  Thank you.    Patient Diagnosis(es): The primary encounter diagnosis was Septicemia (HCC). Diagnoses of Nausea and vomiting, unspecified vomiting type, Hepatic encephalopathy (HCC), and Thrombocytopenia were also pertinent to this visit.     Assessment   Assessment: Pt supine in bed at start of session. Pt tolerated OT session well. Pt completed rolling w/ minAx2 and supine>sit w/ modAx2 - pt demos increased receptiveness to tactile/verbal cues for hand placement/bed rails use. Pt completed functional mobility around bed to chair using SW w/ minAx2 and verbal cues for sequencing, walker management, and safety d/t visual impairment. Pt required increased time to complete functional mobility d/t significant verbal and tactile cues required for safety. Pt is

## 2025-06-02 NOTE — PROGRESS NOTES
Awake on bed, alert and oriented x  4. IV on right hand. Purewick in place. Avasys on. Call light within reach. Side rails x 3. No further concerns. Will continue to monitor.

## 2025-06-02 NOTE — PLAN OF CARE
Problem: Safety - Adult  Goal: Free from fall injury  6/2/2025 1947 by Soni Briceño, RN  Outcome: Progressing  Flowsheets (Taken 6/2/2025 1947)  Free From Fall Injury:   Instruct family/caregiver on patient safety   Based on caregiver fall risk screen, instruct family/caregiver to ask for assistance with transferring infant if caregiver noted to have fall risk factors     Problem: Skin/Tissue Integrity  Goal: Skin integrity remains intact  Description: 1.  Monitor for areas of redness and/or skin breakdown2.  Assess vascular access sites hourly3.  Every 4-6 hours minimum:  Change oxygen saturation probe site4.  Every 4-6 hours:  If on nasal continuous positive airway pressure, respiratory therapy assess nares and determine need for appliance change or resting period  6/2/2025 1947 by Soni Briceño, RN  Outcome: Progressing  Flowsheets  Taken 6/2/2025 1947  Skin Integrity Remains Intact:   Monitor for areas of redness and/or skin breakdown   Assess vascular access sites hourly   Every 4-6 hours minimum:  Change oxygen saturation probe site   Every 4-6 hours:  If on nasal continuous positive airway pressure, assess nares and determine need for appliance change or resting period   Turn and reposition as indicated   Assess need for specialty bed   Positioning devices   Pressure redistribution bed/mattress (bed type)   Check visual cues for pain   Monitor skin under medical devices  Taken 6/2/2025 1945  Skin Integrity Remains Intact:   Monitor for areas of redness and/or skin breakdown   Assess vascular access sites hourly   Every 4-6 hours minimum:  Change oxygen saturation probe site   Every 4-6 hours:  If on nasal continuous positive airway pressure, assess nares and determine need for appliance change or resting period   Turn and reposition as indicated   Assess need for specialty bed   Positioning devices   Pressure redistribution bed/mattress (bed type)   Check visual cues for pain   Monitor skin under medical

## 2025-06-02 NOTE — PROCEDURES
PROCEDURE NOTE  Date: 6/2/2025   Name: Zandra Trammell  YOB: 1950    Procedures  EEG REPORT       Patient: Zandra Trammell Age: 74 y.o.  MRN: [id number]    Date: 5/28/2025  Referring Provider: No ref. provider found    History: This routine 30 minute scalp EEG was recorded with video- monitoring for a 74 y.o.. female  who presented with encephalopathy. This EEG was performed to evaluate for focal and epileptiform abnormalities.     Zandra Trammell   Current Facility-Administered Medications   Medication Dose Route Frequency Provider Last Rate Last Admin    sodium chloride flush 0.9 % injection 5-40 mL  5-40 mL IntraVENous 2 times per day Fernando Manuel MD   10 mL at 05/31/25 1122    sodium chloride flush 0.9 % injection 5-40 mL  5-40 mL IntraVENous PRN Fernando Manuel MD        0.9 % sodium chloride infusion   IntraVENous PRN Fernando Manuel MD        magnesium sulfate 2000 mg in 50 mL IVPB premix  2,000 mg IntraVENous PRN Fernando Manuel MD        [Held by provider] enoxaparin (LOVENOX) injection 40 mg  40 mg SubCUTAneous Daily Fernando Manuel MD   40 mg at 05/31/25 0802    ondansetron (ZOFRAN-ODT) disintegrating tablet 4 mg  4 mg Oral Q8H PRN Fernando Manuel MD        Or    ondansetron (ZOFRAN) injection 4 mg  4 mg IntraVENous Q6H PRN Fernando Manuel MD   4 mg at 05/29/25 1817    polyethylene glycol (GLYCOLAX) packet 17 g  17 g Oral Daily PRN Fernando Manuel MD        acetaminophen (TYLENOL) tablet 650 mg  650 mg Oral Q6H PRN Fernando Manuel MD   650 mg at 06/01/25 0258    Or    acetaminophen (TYLENOL) suppository 650 mg  650 mg Rectal Q6H PRN Fernando Manuel MD        [Held by provider] buPROPion (WELLBUTRIN XL) extended release tablet 150 mg  150 mg Oral Daily Fernando Manuel MD   150 mg at 05/31/25 0802    propranolol (INDERAL) tablet 60 mg  60 mg Oral Daily Fernando Manuel MD   60 mg at 06/02/25 0931    pantoprazole

## 2025-06-02 NOTE — PROGRESS NOTES
Pt stated she might have seen a duck in her room, pt reoriented. Noted swelling of bilateral feet, shifted to saline lock. Pt tolerating oral fluids well.  Will continue to monitor.

## 2025-06-02 NOTE — PROGRESS NOTES
Physical Therapy  Facility/Department: [facility] C3 TELE/MED SURG/ONC  Daily Treatment Note  NAME: Zandra Trammell  : 1950  MRN: [id number]    Date of Service: 2025    Discharge Recommendations:  Subacute/Skilled Nursing Facility   PT Equipment Recommendations  Equipment Needed: No  Other: TBD at SNF    Therapy discharge recommendations are subject to collaboration from the patient’s interdisciplinary healthcare team, including MD and case management recommendations.    Barriers to Home Discharge:   [x] Steps to access home entry or bed/bath: 1-2 RAJINDER home, 2-story home with bed/bath on 2nd floor   [x] Unable to transfer, ambulate, or propel wheelchair household distances without assist   [] Limited available assist at home upon discharge    [] Patient or family requests d/c to post-acute facility    [] Poor cognition/safety awareness for d/c to home alone    [] Unable to maintain ordered weight bearing status    [] Patient with salient signs of long-standing immobility   [x] Decreased independence with ADLs   [x] Increased risk for falls    If pt is unable to be seen after this session, please let this note serve as discharge summary.  Please see case management note for discharge disposition.  Thank you.    Patient Diagnosis(es): The primary encounter diagnosis was Septicemia (HCC). Diagnoses of Nausea and vomiting, unspecified vomiting type, Hepatic encephalopathy (HCC), and Thrombocytopenia were also pertinent to this visit.    Assessment  Assessment: Pt participated well when seen for PT follow-up this date. Pt seen in conjunction with OT for co-treat session in light of current deficits, in order to safely progress functional mobility. Pt progressing well since last seen by PT, requiring modA x 2 for bed mobility and Marcela x 1-2 for transfers and gait with support of RW. Gait is slow and mildly unsteady with near-constant cueing needed for proper sequencing with walker and direction (in light of pt's

## 2025-06-02 NOTE — PROGRESS NOTES
Zandra Trammell  Neurology Follow-up  [facility] Neurology    Date of Service: 6/2/2025    Subjective:   CC: Follow up today regarding: altered mental status, walking     Events noted. Chart and lab reviewed. EEG pending.      ROS : A 10-12 system review obtained and updated today and is unremarkable except as mentioned  in my interval history.     Medication, past medical history, social history, and family history reviewed.    Objective:  Exam:   Constitutional:   Vitals:    06/01/25 1501 06/01/25 2147 06/02/25 0812 06/02/25 1445   BP: 137/71 (!) 148/83 (!) 148/79 138/67   Pulse: 73 71 72 70   Resp: 18 18 18    Temp: 97.5 °F (36.4 °C) 97.5 °F (36.4 °C) 98.1 °F (36.7 °C)    TempSrc:  Oral Oral    SpO2: 95% 94% 93% 93%   Weight:       Height:           General appearance:  Normal development and appear in no acute distress.   Mental Status:   Oriented to person, place, month only.   Memory: Poor recall  Poor attention span and concentration.  Language: intact naming, repeating and fluency   Poor fund of Knowledge.   Cranial Nerves:   II:   Pupils: equal, round, reactive to light  III,IV,VI: Extra Ocular Movements are intact. No nystagmus  V: Facial sensation is intact  VII: Facial strength and movements: intact and symmetric  Musculoskeletal: 5/5 in all 4 extremities.   Coordination: no pronator drift, no dysmetria with FNF.   Plantars: LLE downgoing, RUE upgoing.   Sensation: normal to light touch in both arms and legs.      Data:  LABS:   Lab Results   Component Value Date/Time     06/02/2025 05:52 AM    K 3.3 06/02/2025 05:52 AM     06/02/2025 05:52 AM    CO2 21 06/02/2025 05:52 AM    BUN 9 06/02/2025 05:52 AM    CREATININE 0.6 06/02/2025 05:52 AM    GFRAA >60 09/17/2019 02:25 PM    GFRAA >60 06/04/2013 12:40 PM    LABGLOM >90 06/02/2025 05:52 AM    GLUCOSE 81 06/02/2025 05:52 AM    PHOS 3.3 05/29/2025 05:02 AM    MG 1.76 06/02/2025 05:52 AM    CALCIUM 8.5 06/02/2025 05:52 AM     Lab Results  patient      Impression:  Seizure like activity-- Recurrent spells of severe confusion, auditory hallucinations, thrashing in bed. Ddx toxic metabolic encephalopathy (hyperammonemia, febrile illness, cefepime exposure), seizures  Parkinsonism      Recommendation  EEG pending interpretation  F/u outpatient neurology 3 mos        Electronically signed by VIRGIL Campa CNP on 6/2/25 at 4:08 PM EDT       This dictation was generated by voice recognition computer software. Although all attempts are made to edit the dictation for accuracy, there may be errors in the transcription that are not intended.

## 2025-06-02 NOTE — PROGRESS NOTES
[facility] Medicine Progress Note  V 5.17      Date of Admission: 5/28/2025    Hospital Day: 6      Chief Admission Complaint:  Vomiting (Per EMS report pt has been vomiting for the past couple of hours. She has a hx of liver disease with hepatic encephalopathy.  reporting pt was \"fine\" yesterday and had an appt with her \"sleep doctor\" aka neurologist. Patient is legally blind. )    Subjective:      Patient was evaluated at bedside this morning. Denies any acute concerns. She denies any vomiting or abdominal pain at this time.      Presenting Admission History:       \" 74 y.o. female who presented to [facility] with Nausea, vomiting, weakness, couldn't walk.  PMHx significant for Anxiety, cirrhosis, HTN. Patient seen at bedside on the morning of admission. Patient mentions ongoing nausea, vomiting, weakness and trouble walking ongoing for past day. Patient also mentions ongoing intermittent fevers with dry cough. Describes abdominal pain 5/10 in severity in middle of abdomen. Patient has some trouble with vision. No hematochezia, hematemesis, pain anywhere else, sob, hypoxic events. Patient at home was able to complete ADLs independently. \"       Assessment/Plan:      Sepsis  - On admission with fever, tachycardia, elevated lactic acid. Source concerning for possible aspiration pneumonia versus gastric  - Initially started on Vanco and ceftriaxone, however due to patient not having any obvious source of infection antibiotics were discontinued  - Lactic acid resolved 5/31    Cryptogenic cirrhosis with ulcerative colitis  - Was not taking treatment prior to arrival  - GI following, encouraged on starting and continue rifaximin and propranolol  - Will also continue lactulose with diet    Acute encephalopathy  Altered mental status  - Unlikely related to hepatic encephalopathy per GI  - CT head negative  - MRI without any acute findings  - Ammonia levels WNL  - Tox screen negative  -  the last 72 hours.  Recent Labs     05/30/25  1352 05/31/25  0130 05/31/25  1325   LACTA 2.3* 1.3 1.5       Urine Cultures: No results found for: \"LABURIN\"  Blood Cultures:   Lab Results   Component Value Date/Time    BC No Growth after 4 days of incubation. 05/28/2025 06:50 AM     Lab Results   Component Value Date/Time    BLOODCULT2 No Growth after 4 days of incubation. 05/28/2025 06:50 AM     Organism: No results found for: \"ORG\"      Ruth Chavez MD

## 2025-06-02 NOTE — CARE COORDINATION
Reviewed chart and met with patient, spouse at bedside, today to inform of social work role, discuss dc options and plan of care, community resources and support. Explained Case Management role/services.       Pt's spouse stepped out to lema to discuss care plan. States he has toured facilities and would like SNF referrals to both [facility]; referrals made and under review; [facility] acccept.     Per spouse, he has concerns that patient may not be able to transition back home after skilled stay and is trying to decide whether to go with [facility] who has LTC unit and would mean less transition for patient if unable to return home. Pt is legally considered blind and spouse stating he is unsure he can provide level of care needed ultimately.     Will follow for progress and needs.     SNF referrals in place, no precert required with traditional medicare.     LG

## 2025-06-02 NOTE — PLAN OF CARE
Problem: Safety - Adult  Goal: Free from fall injury  Outcome: Progressing  Flowsheets (Taken 6/1/2025 2008)  Free From Fall Injury:   Instruct family/caregiver on patient safety   Based on caregiver fall risk screen, instruct family/caregiver to ask for assistance with transferring infant if caregiver noted to have fall risk factors

## 2025-06-02 NOTE — PROGRESS NOTES
PROGRESS NOTE    HPI: Zandra Trammell is a(n)74 y.o. female admitted for work-up and treatment for Hepatic encephalopathy (HCC) [K76.82]  SOB (shortness of breath) [R06.02]  Thrombocytopenia [D69.6]  Septicemia (HCC) [A41.9]  Nausea and vomiting, unspecified vomiting type [R11.2].     We are following for cirrhosis, HE.    Subjective:     She seems to be doing well compared to admission.  Answering questions appropriately.  No nausea or vomiting.      Objective:     I/O last 3 completed shifts:  In: 580 [P.O.:580]  Out: 2000 [Urine:2000]      BP (!) 148/79   Pulse 72   Temp 98.1 °F (36.7 °C) (Oral)   Resp 18   Ht 1.6 m (5' 3\")   Wt 77 kg (169 lb 12.1 oz)   SpO2 93%   BMI 30.07 kg/m²     Physical Exam:  HEENT: anicteric sclera, oropharyngeal membranes pink and moist.  Cor: RRR  Lungs: non-labored, no respiratory distress  Abdomen: obese, soft, NT. No ascites.  No hepatomegaly or splenomegaly  Neuro: alert, oriented x 3, no asterixis.       Results:   Lab Results   Component Value Date    ALT 13 05/28/2025    AST 33 05/28/2025    ALKPHOS 69 05/28/2025    INR 1.38 (H) 05/28/2025    LIPASE 36.0 05/28/2025     Lab Results   Component Value Date    WBC 5.3 06/02/2025    HGB 11.8 (L) 06/02/2025    HCT 33.8 (L) 06/02/2025    MCV 92.9 06/02/2025    PLT 88 (L) 06/02/2025     BUN/Cr/glu/ALT/AST/amyl/lip:  9/0.6/--/--/--/--/-- (06/02 0552)  CT HEAD WO CONTRAST  Result Date: 5/28/2025  Impression: No acute intracranial abnormality or mass effect. Electronically signed by Bony Damico MD    CT ABDOMEN PELVIS W IV CONTRAST Additional Contrast? None  Result Date: 5/28/2025  1. No evidence of pulmonary embolism to the segmental level. 2. Hiatal hernia. Multiple esophageal varices. ============= EXAM: CT ABDOMEN AND PELVIS WITH CONTRAST INDICATION: history of cirrhosis, vomiting, fever with genrealized abdominal pain, Pain COMPARISON: None. TECHNIQUE: Axial CT imaging

## 2025-06-02 NOTE — CARE COORDINATION
Both [facility] can accept for SNF LOC; spouse has concerns that LTC may eventually be needed. [facility] [place] business office to call and discuss LTC cost and options with spouse; spouse then to make decision.      CM to follow up for choice of facility.     LG

## 2025-06-03 LAB
ANION GAP SERPL CALCULATED.3IONS-SCNC: 10 MMOL/L (ref 3–16)
BUN SERPL-MCNC: 10 MG/DL (ref 7–20)
CALCIUM SERPL-MCNC: 8.5 MG/DL (ref 8.3–10.6)
CHLORIDE SERPL-SCNC: 112 MMOL/L (ref 99–110)
CO2 SERPL-SCNC: 22 MMOL/L (ref 21–32)
CREAT SERPL-MCNC: 0.6 MG/DL (ref 0.6–1.2)
DEPRECATED RDW RBC AUTO: 12.8 % (ref 12.4–15.4)
GFR SERPLBLD CREATININE-BSD FMLA CKD-EPI: >90 ML/MIN/{1.73_M2}
GLUCOSE SERPL-MCNC: 82 MG/DL (ref 70–99)
HCT VFR BLD AUTO: 34 % (ref 36–48)
HGB BLD-MCNC: 11.9 G/DL (ref 12–16)
MAGNESIUM SERPL-MCNC: 1.87 MG/DL (ref 1.8–2.4)
MCH RBC QN AUTO: 32.8 PG (ref 26–34)
MCHC RBC AUTO-ENTMCNC: 35 G/DL (ref 31–36)
MCV RBC AUTO: 93.6 FL (ref 80–100)
PLATELET # BLD AUTO: 117 K/UL (ref 135–450)
PMV BLD AUTO: 8.1 FL (ref 5–10.5)
POTASSIUM SERPL-SCNC: 3.5 MMOL/L (ref 3.5–5.1)
RBC # BLD AUTO: 3.63 M/UL (ref 4–5.2)
SODIUM SERPL-SCNC: 144 MMOL/L (ref 136–145)
WBC # BLD AUTO: 5.9 K/UL (ref 4–11)

## 2025-06-03 PROCEDURE — 6370000000 HC RX 637 (ALT 250 FOR IP): Performed by: NURSE PRACTITIONER

## 2025-06-03 PROCEDURE — 36415 COLL VENOUS BLD VENIPUNCTURE: CPT

## 2025-06-03 PROCEDURE — 51798 US URINE CAPACITY MEASURE: CPT

## 2025-06-03 PROCEDURE — 2580000003 HC RX 258

## 2025-06-03 PROCEDURE — 1200000000 HC SEMI PRIVATE

## 2025-06-03 PROCEDURE — 80048 BASIC METABOLIC PNL TOTAL CA: CPT

## 2025-06-03 PROCEDURE — 6370000000 HC RX 637 (ALT 250 FOR IP): Performed by: STUDENT IN AN ORGANIZED HEALTH CARE EDUCATION/TRAINING PROGRAM

## 2025-06-03 PROCEDURE — 83735 ASSAY OF MAGNESIUM: CPT

## 2025-06-03 PROCEDURE — APPNB30 APP NON BILLABLE TIME 0-30 MINS

## 2025-06-03 PROCEDURE — 6370000000 HC RX 637 (ALT 250 FOR IP)

## 2025-06-03 PROCEDURE — 85027 COMPLETE CBC AUTOMATED: CPT

## 2025-06-03 PROCEDURE — 51702 INSERT TEMP BLADDER CATH: CPT

## 2025-06-03 RX ORDER — TAMSULOSIN HYDROCHLORIDE 0.4 MG/1
0.4 CAPSULE ORAL DAILY
Status: DISCONTINUED | OUTPATIENT
Start: 2025-06-03 | End: 2025-06-04 | Stop reason: HOSPADM

## 2025-06-03 RX ADMIN — SODIUM CHLORIDE, SODIUM LACTATE, POTASSIUM CHLORIDE, AND CALCIUM CHLORIDE: .6; .31; .03; .02 INJECTION, SOLUTION INTRAVENOUS at 17:30

## 2025-06-03 RX ADMIN — LACTULOSE 20 G: 20 SOLUTION ORAL at 09:11

## 2025-06-03 RX ADMIN — RIFAXIMIN 400 MG: 200 TABLET ORAL at 20:19

## 2025-06-03 RX ADMIN — LACTULOSE 20 G: 20 SOLUTION ORAL at 20:19

## 2025-06-03 RX ADMIN — PANTOPRAZOLE SODIUM 40 MG: 40 TABLET, DELAYED RELEASE ORAL at 05:53

## 2025-06-03 RX ADMIN — TAMSULOSIN HYDROCHLORIDE 0.4 MG: 0.4 CAPSULE ORAL at 17:27

## 2025-06-03 RX ADMIN — LACTULOSE 20 G: 20 SOLUTION ORAL at 14:15

## 2025-06-03 RX ADMIN — RIFAXIMIN 400 MG: 200 TABLET ORAL at 14:15

## 2025-06-03 RX ADMIN — RIFAXIMIN 400 MG: 200 TABLET ORAL at 09:11

## 2025-06-03 RX ADMIN — PROPRANOLOL HYDROCHLORIDE 60 MG: 40 TABLET ORAL at 09:11

## 2025-06-03 NOTE — CARE COORDINATION
Spoke with Cristo, patients . Stated he has decided on [facility] for skilled care. Notified could be d/c'd tomorrow. Will keep him updated. Notified Almaz of likely d/c tomorrow. No cert needed, has 6 mn. Harmeet Cleary RN

## 2025-06-03 NOTE — PLAN OF CARE
Problem: Safety - Adult  Goal: Free from fall injury  Outcome: Progressing  Flowsheets (Taken 6/3/2025 1031)  Free From Fall Injury:   Instruct family/caregiver on patient safety   Based on caregiver fall risk screen, instruct family/caregiver to ask for assistance with transferring infant if caregiver noted to have fall risk factors     Problem: Pain  Goal: Verbalizes/displays adequate comfort level or baseline comfort level  Outcome: Progressing  Flowsheets (Taken 6/3/2025 1031)  Verbalizes/displays adequate comfort level or baseline comfort level:   Encourage patient to monitor pain and request assistance   Assess pain using appropriate pain scale   Implement non-pharmacological measures as appropriate and evaluate response   Administer analgesics based on type and severity of pain and evaluate response     Problem: Genitourinary - Adult  Goal: Absence of urinary retention  Outcome: Progressing  Flowsheets (Taken 6/3/2025 1031)  Absence of urinary retention:   Assess patient’s ability to void and empty bladder   Monitor intake/output and perform bladder scan as needed

## 2025-06-03 NOTE — PROGRESS NOTES
4 Eyes Skin Assessment and Patient belongings     The patient is being assess for  Shift Handoff    I agree that 2 Nurses have performed a thorough Head to Toe Skin Assessment on the patient. ALL assessment sites listed below have been assessed.       Areas assessed by both nurses: Georgia MCDOWELL/ Daphne MCDOWELL   [x]   Head, Face, and Ears   [x]   Shoulders, Back, and Chest  [x]   Arms, Elbows, and Hands   [x]   Coccyx, Sacrum, and IschIum  [x]   Legs, Feet, and Heels        Does the Patient have Skin Breakdown?  No         Aneudy Prevention initiated:  Yes   Wound Care Orders initiated:  No      C nurse consulted for Pressure Injury (Stage 3,4, Unstageable, DTI, NWPT, and Complex wounds), New and Established Ostomies:  No      I agree that 2 Nurses have reviewed patient belongings with the patient/family and documented in the flowsheet upon admission or transfer to the unit.     Belongings  Dental Appliances: None  Vision - Corrective Lenses: None  Hearing Aid: None  Clothing: At bedside, Socks, Shirt, Pajamas, Pants  Jewelry: None  Body Piercings Removed: N/A  Electronic Devices: At home  Weapons (Notify Protective Services/Security): None  Other Valuables: At home  Home Medications: None  Valuables Given To: Family (Comment)  Provide Name(s) of Who Valuable(s) Were Given To: saroj  Responsible person(s) in the waiting room:        Nurse 1 eSignature: Electronically signed by Georgia Zabala RN on 6/3/25 at 6:58 PM EDT    **SHARE this note so that the co-signing nurse is able to place an eSignature**    Nurse 2 eSignature: Daphne Hernández RN.

## 2025-06-03 NOTE — PROGRESS NOTES
Zandra Trammell  Neurology Follow-up  [facility] Neurology    Date of Service: 6/3/2025    Subjective:   CC: Follow up today regarding: altered mental status    Events noted. Chart and lab reviewed. EEG normal. Pt has returned to baseline.      ROS : A 10-12 system review obtained and updated today and is unremarkable except as mentioned  in my interval history.     Medication, past medical history, social history, and family history reviewed.    Objective:  Exam:   Constitutional:   Vitals:    06/03/25 0012 06/03/25 0554 06/03/25 0910 06/03/25 1647   BP: 136/74 (!) 149/72 (!) 140/70 (!) 152/77   Pulse: 71 67 68 72   Resp: 17 19 18 17   Temp:  97.3 °F (36.3 °C) 97.3 °F (36.3 °C) 97.9 °F (36.6 °C)   TempSrc:  Oral Oral Oral   SpO2: 91% 92% 94% 98%   Weight:       Height:         General appearance:  Normal development and appear in no acute distress.   Mental Status:   Oriented to person, place, problem, and time.    Memory: Good immediate recall.  Intact remote memory  Normal attention span and concentration.  Language: intact naming, repeating and fluency   Good fund of Knowledge.   Cranial Nerves:   II:   Pupils: equal, round, reactive to light  III,IV,VI: Extra Ocular Movements are intact. No nystagmus  V: Facial sensation is intact  VII: Facial strength and movements: intact and symmetric  XII: Tongue movements are normal  Musculoskeletal: 5/5 in all 4 extremities.   Coordination: no pronator drift, no dysmetria with FNF.   Sensation: normal to light touch in both arms and legs.        Data:  LABS:   Lab Results   Component Value Date/Time     06/03/2025 06:35 AM    K 3.5 06/03/2025 06:35 AM     06/03/2025 06:35 AM    CO2 22 06/03/2025 06:35 AM    BUN 10 06/03/2025 06:35 AM    CREATININE 0.6 06/03/2025 06:35 AM    GFRAA >60 09/17/2019 02:25 PM    GFRAA >60 06/04/2013 12:40 PM    LABGLOM >90 06/03/2025 06:35 AM    GLUCOSE 82 06/03/2025 06:35 AM    PHOS 3.3 05/29/2025 05:02 AM    MG 1.87 06/03/2025  test results and discussed results with the patient      Impression:  Seizure like activity-- Recurrent spells of severe confusion, auditory hallucinations, thrashing in bed. Ddx toxic metabolic encephalopathy (hyperammonemia, febrile illness, cefepime exposure), less likely seizures. EEG normal.  Parkinsonism         Recommendation  If spells recur may consider antiseizure medication in the future.  F/u outpatient neurology 3 mos  Neurology will sign off.         Electronically signed by VIRGIL Campa CNP on 6/3/25 at 5:56 PM EDT       This dictation was generated by voice recognition computer software. Although all attempts are made to edit the dictation for accuracy, there may be errors in the transcription that are not intended.

## 2025-06-03 NOTE — PROGRESS NOTES
[facility] Medicine Progress Note  V 5.17      Date of Admission: 5/28/2025    Hospital Day: 7      Chief Admission Complaint:  Vomiting (Per EMS report pt has been vomiting for the past couple of hours. She has a hx of liver disease with hepatic encephalopathy.  reporting pt was \"fine\" yesterday and had an appt with her \"sleep doctor\" aka neurologist. Patient is legally blind. )    Subjective:      Patient was evaluated at bedside this morning. Denies any acute concerns. She denies any vomiting or abdominal pain at this time.      Presenting Admission History:       74 y.o. female who presented to [facility] with Nausea, vomiting, weakness, couldn't walk.  PMHx significant for Anxiety, cirrhosis, HTN. Patient seen at bedside on the morning of admission. Patient mentions ongoing nausea, vomiting, weakness and trouble walking ongoing for past day. Patient also mentions ongoing intermittent fevers with dry cough. Describes abdominal pain 5/10 in severity in middle of abdomen. Patient has some trouble with vision. No hematochezia, hematemesis, pain anywhere else, sob, hypoxic events. Patient at home was able to complete ADLs independently.     Assessment/Plan:      Cryptogenic cirrhosis with ulcerative colitis  - Was not taking treatment prior to arrival  - GI following, encouraged on starting and continue rifaximin and propranolol  - Will also continue lactulose with diet    Acute encephalopathy  Altered mental status  - Unlikely related to hepatic encephalopathy per GI  - CT head negative  - MRI without any acute findings  - Ammonia levels WNL  - Tox screen negative  - Neurology has been consulted, patient to follow-up outpatient  - Hold Wellbutrin and Compazine per neurology  - Continue lactulose and Xifaxan per GI  - EEG completed , no concert    Sepsis (resolved)  - On admission with fever, tachycardia, elevated lactic acid. Source concerning for possible aspiration pneumonia versus  Aminoglycosides, etc)     [] Post-Cath/Contrast study requiring serial monitoring    [] IV Narcotic analgesia    [] Aggressive IV diuresis    [] Hypertonic Saline    [] Critical electrolyte abnormalities requiring IV replacement    [] Insulin - Scheduled/SSI or Insulin gtt    [] Anticoagulation (Heparin gtt or Coumadin - other anticoagulants in special circumstances)    [] Cardiac Medications (IV Amiodarone/Diltiazem, Tikosyn, etc)    [] Hemodialysis    [] Other -    [] Change in code status    [] Decision to escalate care    [] Major surgery/procedure with associated risk factors    --------------------------------------------------  C. Data (any 2)    [x] Data Review (any 3)    [x] Consultant/subspecialist notes from yesterday/today    [x] All available current labs reviewed interpreted for clinical significance    [x] Appropriate follow-up labs were ordered  [] Collateral history obtained     [x] Independent Interpretation of tests (any 1)    [x] Telemetry (Rhythm Strip) personally reviewed and interpreted        [] Imaging personally reviewed and interpreted     [x] Discussion (any 1)  [x] Multi-Disciplinary Rounds with Case Management  [] Discussed management of the case with           Labs:  Personally reviewed on 6/3/2025 and interpreted for clinical significance as documented above.     Recent Labs     06/01/25  0532 06/02/25  0552 06/03/25  0635   WBC 3.9* 5.3 5.9   HGB 11.3* 11.8* 11.9*   HCT 32.4* 33.8* 34.0*   PLT 64* 88* 117*     Recent Labs     06/01/25  0532 06/02/25  0552 06/03/25  0635    142 144   K 3.4* 3.3* 3.5   * 112* 112*   CO2 20* 21 22   BUN 10 9 10   CREATININE 0.7 0.6 0.6   CALCIUM 8.0* 8.5 8.5   MG 1.84 1.76* 1.87     No results for input(s): \"PROBNP\", \"TROPHS\" in the last 72 hours.    No results for input(s): \"LABA1C\" in the last 72 hours.  No results for input(s): \"AST\", \"ALT\", \"BILIDIR\", \"BILITOT\", \"ALKPHOS\" in the last 72 hours.  No results for input(s): \"INR\", \"LACTA\",

## 2025-06-03 NOTE — PROGRESS NOTES
Pt is alert and oriented with intermittent confusion. VSS. RA. Pt denies pain and discomfort at this time. BS active x4. Pt had a BM this morning after receiving lactulose. Shift assessment completed and documented. Call light within reach. Bed side table within reach. Wheels locked. Bed in lowest position. Bed check in place. Pt instructed to call out for assistance. Pt expressesed understanding & calls out appropritately. All care per orders. Electronically signed by Georgia Zabala RN on 6/3/2025 at 1:06 PM

## 2025-06-03 NOTE — PROGRESS NOTES
PROGRESS NOTE    HPI: Zandra Trammell is a(n)74 y.o. female admitted for work-up and treatment for Hepatic encephalopathy (HCC) [K76.82]  SOB (shortness of breath) [R06.02]  Thrombocytopenia [D69.6]  Septicemia (HCC) [A41.9]  Nausea and vomiting, unspecified vomiting type [R11.2].     We are following for cirrhosis, HE.    Subjective:     No GI complaints or acute events overnight.        Objective:     I/O last 3 completed shifts:  In: 603.9 [P.O.:100; I.V.:454.3; IV Piggyback:49.6]  Out: 2188 [Urine:2188]      BP (!) 140/70   Pulse 68   Temp 97.3 °F (36.3 °C) (Oral)   Resp 18   Ht 1.6 m (5' 3\")   Wt 77 kg (169 lb 12.1 oz)   SpO2 94%   BMI 30.07 kg/m²     Physical Exam:  HEENT: anicteric sclera, oropharyngeal membranes pink and moist.  Cor: RRR  Lungs: non-labored, no respiratory distress  Abdomen: obese, soft, NT. No ascites.  No hepatomegaly or splenomegaly  Neuro: alert, oriented x 3, no asterixis.       Results:   Lab Results   Component Value Date    ALT 13 05/28/2025    AST 33 05/28/2025    ALKPHOS 69 05/28/2025    INR 1.38 (H) 05/28/2025    LIPASE 36.0 05/28/2025     Lab Results   Component Value Date    WBC 5.9 06/03/2025    HGB 11.9 (L) 06/03/2025    HCT 34.0 (L) 06/03/2025    MCV 93.6 06/03/2025     (L) 06/03/2025     BUN/Cr/glu/ALT/AST/amyl/lip:  10/0.6/--/--/--/--/-- (06/03 0635)  CT HEAD WO CONTRAST  Result Date: 5/28/2025  Impression: No acute intracranial abnormality or mass effect. Electronically signed by Bony Damico MD    CT ABDOMEN PELVIS W IV CONTRAST Additional Contrast? None  Result Date: 5/28/2025  1. No evidence of pulmonary embolism to the segmental level. 2. Hiatal hernia. Multiple esophageal varices. ============= EXAM: CT ABDOMEN AND PELVIS WITH CONTRAST INDICATION: history of cirrhosis, vomiting, fever with genrealized abdominal pain, Pain COMPARISON: None. TECHNIQUE: Axial CT imaging obtained from lung bases

## 2025-06-03 NOTE — PROGRESS NOTES
Received patient in bed. Alert and oriented/disoriented at times. On Avasys.   Assessment completed and documented. VS charted.   Establish new line on her L hand with IV infusion.  BM once and currently with purewick in place. Bladder scan q6h as ordered.   Bed locked and in lowest position. Side rails up x3.   Bedside table and call light within reach. Denies further needs at this time.

## 2025-06-04 VITALS
RESPIRATION RATE: 18 BRPM | HEART RATE: 74 BPM | SYSTOLIC BLOOD PRESSURE: 139 MMHG | OXYGEN SATURATION: 94 % | TEMPERATURE: 97.7 F | DIASTOLIC BLOOD PRESSURE: 78 MMHG | BODY MASS INDEX: 30.08 KG/M2 | WEIGHT: 169.75 LBS | HEIGHT: 63 IN

## 2025-06-04 LAB
ANION GAP SERPL CALCULATED.3IONS-SCNC: 10 MMOL/L (ref 3–16)
BUN SERPL-MCNC: 9 MG/DL (ref 7–20)
CALCIUM SERPL-MCNC: 8.6 MG/DL (ref 8.3–10.6)
CHLORIDE SERPL-SCNC: 113 MMOL/L (ref 99–110)
CO2 SERPL-SCNC: 23 MMOL/L (ref 21–32)
CREAT SERPL-MCNC: 0.7 MG/DL (ref 0.6–1.2)
DEPRECATED RDW RBC AUTO: 12.7 % (ref 12.4–15.4)
GFR SERPLBLD CREATININE-BSD FMLA CKD-EPI: 89 ML/MIN/{1.73_M2}
GLUCOSE SERPL-MCNC: 84 MG/DL (ref 70–99)
HCT VFR BLD AUTO: 34.5 % (ref 36–48)
HGB BLD-MCNC: 12.1 G/DL (ref 12–16)
MAGNESIUM SERPL-MCNC: 1.75 MG/DL (ref 1.8–2.4)
MCH RBC QN AUTO: 32.8 PG (ref 26–34)
MCHC RBC AUTO-ENTMCNC: 35.1 G/DL (ref 31–36)
MCV RBC AUTO: 93.2 FL (ref 80–100)
PLATELET # BLD AUTO: 129 K/UL (ref 135–450)
PMV BLD AUTO: 8 FL (ref 5–10.5)
POTASSIUM SERPL-SCNC: 3.4 MMOL/L (ref 3.5–5.1)
RBC # BLD AUTO: 3.7 M/UL (ref 4–5.2)
SODIUM SERPL-SCNC: 146 MMOL/L (ref 136–145)
WBC # BLD AUTO: 6.7 K/UL (ref 4–11)

## 2025-06-04 PROCEDURE — 6370000000 HC RX 637 (ALT 250 FOR IP): Performed by: STUDENT IN AN ORGANIZED HEALTH CARE EDUCATION/TRAINING PROGRAM

## 2025-06-04 PROCEDURE — 36415 COLL VENOUS BLD VENIPUNCTURE: CPT

## 2025-06-04 PROCEDURE — 85027 COMPLETE CBC AUTOMATED: CPT

## 2025-06-04 PROCEDURE — 6370000000 HC RX 637 (ALT 250 FOR IP)

## 2025-06-04 PROCEDURE — 6370000000 HC RX 637 (ALT 250 FOR IP): Performed by: NURSE PRACTITIONER

## 2025-06-04 PROCEDURE — 97530 THERAPEUTIC ACTIVITIES: CPT

## 2025-06-04 PROCEDURE — 97116 GAIT TRAINING THERAPY: CPT

## 2025-06-04 PROCEDURE — 83735 ASSAY OF MAGNESIUM: CPT

## 2025-06-04 PROCEDURE — 97535 SELF CARE MNGMENT TRAINING: CPT

## 2025-06-04 PROCEDURE — 80048 BASIC METABOLIC PNL TOTAL CA: CPT

## 2025-06-04 RX ORDER — TAMSULOSIN HYDROCHLORIDE 0.4 MG/1
0.4 CAPSULE ORAL DAILY
Qty: 30 CAPSULE | Refills: 3 | Status: SHIPPED | OUTPATIENT
Start: 2025-06-05

## 2025-06-04 RX ORDER — LACTULOSE 10 G/15ML
20 SOLUTION ORAL 3 TIMES DAILY
Qty: 2700 ML | Refills: 0 | Status: SHIPPED | OUTPATIENT
Start: 2025-06-04

## 2025-06-04 RX ORDER — POTASSIUM CHLORIDE 1500 MG/1
40 TABLET, EXTENDED RELEASE ORAL ONCE
Status: COMPLETED | OUTPATIENT
Start: 2025-06-04 | End: 2025-06-04

## 2025-06-04 RX ORDER — PANTOPRAZOLE SODIUM 40 MG/1
40 TABLET, DELAYED RELEASE ORAL
Qty: 30 TABLET | Refills: 3 | Status: SHIPPED | OUTPATIENT
Start: 2025-06-05

## 2025-06-04 RX ORDER — FOLIC ACID 0.4 MG
400 TABLET ORAL DAILY
Qty: 30 TABLET | Refills: 0 | Status: SHIPPED | OUTPATIENT
Start: 2025-06-04

## 2025-06-04 RX ORDER — PROPRANOLOL HYDROCHLORIDE 60 MG/1
60 TABLET ORAL DAILY
Qty: 90 TABLET | Refills: 3 | Status: SHIPPED | OUTPATIENT
Start: 2025-06-05

## 2025-06-04 RX ORDER — DESVENLAFAXINE 50 MG/1
50 TABLET, FILM COATED, EXTENDED RELEASE ORAL DAILY
Qty: 30 TABLET | Refills: 0 | Status: SHIPPED | OUTPATIENT
Start: 2025-06-04

## 2025-06-04 RX ORDER — HYDROXYZINE HYDROCHLORIDE 25 MG/1
25 TABLET, FILM COATED ORAL EVERY 8 HOURS PRN
Qty: 30 TABLET | Refills: 0 | Status: SHIPPED | OUTPATIENT
Start: 2025-06-04 | End: 2025-06-14

## 2025-06-04 RX ADMIN — RIFAXIMIN 400 MG: 200 TABLET ORAL at 08:40

## 2025-06-04 RX ADMIN — PANTOPRAZOLE SODIUM 40 MG: 40 TABLET, DELAYED RELEASE ORAL at 06:21

## 2025-06-04 RX ADMIN — POTASSIUM CHLORIDE 40 MEQ: 1500 TABLET, EXTENDED RELEASE ORAL at 09:23

## 2025-06-04 RX ADMIN — PROPRANOLOL HYDROCHLORIDE 60 MG: 40 TABLET ORAL at 08:40

## 2025-06-04 RX ADMIN — TAMSULOSIN HYDROCHLORIDE 0.4 MG: 0.4 CAPSULE ORAL at 08:40

## 2025-06-04 RX ADMIN — ACETAMINOPHEN 650 MG: 325 TABLET ORAL at 08:40

## 2025-06-04 ASSESSMENT — PAIN DESCRIPTION - LOCATION: LOCATION: HEAD

## 2025-06-04 ASSESSMENT — PAIN SCALES - GENERAL: PAINLEVEL_OUTOF10: 6

## 2025-06-04 NOTE — PROGRESS NOTES
Discharge to [facility] instructions given to transport service. Pt discharged in stable condition. Pt transported via transport service.  All pt belongings given to patient.  at bedside.

## 2025-06-04 NOTE — PROGRESS NOTES
Pt alert and oriented with intermittent confusion. VSS. RA. Pt c/o headache. Pt given PRN pain medication per MAR. Pt with gomes cath in place for retention. Pt up to the chair this morning with a x1 stand and pivot. Shift assessment completed and documented. Call light within reach. Bed side table within reach. Wheels locked. Bed in lowest position. Bed check in place. Pt instructed to call out for assistance. Pt expressesed understanding & calls out appropritately. All care per orders. Electronically signed by Georgia Zabala RN on 6/4/2025 at 12:54 PM

## 2025-06-04 NOTE — PLAN OF CARE
Problem: Safety - Adult  Goal: Free from fall injury  Outcome: Progressing  Flowsheets (Taken 6/4/2025 1250)  Free From Fall Injury:   Instruct family/caregiver on patient safety   Based on caregiver fall risk screen, instruct family/caregiver to ask for assistance with transferring infant if caregiver noted to have fall risk factors     Problem: Pain  Goal: Verbalizes/displays adequate comfort level or baseline comfort level  Outcome: Progressing  Flowsheets (Taken 6/4/2025 1250)  Verbalizes/displays adequate comfort level or baseline comfort level:   Encourage patient to monitor pain and request assistance   Assess pain using appropriate pain scale   Administer analgesics based on type and severity of pain and evaluate response   Implement non-pharmacological measures as appropriate and evaluate response     Problem: Musculoskeletal - Adult  Goal: Return mobility to safest level of function  Recent Flowsheet Documentation  Taken 6/4/2025 0843 by Georgia Zabala, RN  Return Mobility to Safest Level of Function: Assess patient stability and activity tolerance for standing, transferring and ambulating with or without assistive devices

## 2025-06-04 NOTE — PROGRESS NOTES
Physical Therapy  Facility/Department: [facility] C3 TELE/MED SURG/ONC  Daily Treatment Note  NAME: Zandra Trammell  : 1950  MRN: [id number]    Date of Service: 2025    Discharge Recommendations:  Subacute/Skilled Nursing Facility   PT Equipment Recommendations  Equipment Needed: No  Other: TBD at SNF    Therapy discharge recommendations are subject to collaboration from the patient’s interdisciplinary healthcare team, including MD and case management recommendations.    Barriers to Home Discharge:   [x] Steps to access home entry or bed/bath: 1-2 RAJINDER home, 2-story home with bed/bath located on 2nd floor   [x] Unable to transfer, ambulate, or propel wheelchair household distances without assist   [] Limited available assist at home upon discharge    [x] Patient or family requests d/c to post-acute facility    [x] Poor cognition/safety awareness for d/c to home alone    [] Unable to maintain ordered weight bearing status    [] Patient with salient signs of long-standing immobility   [x] Decreased independence with ADLs   [x] Increased risk for falls    If pt is unable to be seen after this session, please let this note serve as discharge summary.  Please see case management note for discharge disposition.  Thank you.    Patient Diagnosis(es): The primary encounter diagnosis was Septicemia (HCC). Diagnoses of Nausea and vomiting, unspecified vomiting type, Hepatic encephalopathy (HCC), and Thrombocytopenia were also pertinent to this visit.    Assessment  Assessment: Pt participated well when seen for PT follow-up this date. Pt seen in conjunction with OT for co-treat session in light of current deficits in safety, cognition, and mobility, in order to safely progress functional mobility and maximize therapeutic outcomes. Pt progressing well since last seen by PT, now performing functional mobility tasks with CGA/close SBA x 1 with support of walker for standing tasks and gait. Regular cues needed for safe

## 2025-06-04 NOTE — PROGRESS NOTES
Occupational Therapy  Facility/Department: [facility] C3 TELE/MED SURG/ONC  Daily Treatment Note  NAME: Zandra Trammell  : 1950  MRN: [id number]    Date of Service: 2025    Discharge Recommendations:  Subacute/Skilled Nursing Facility     Therapy discharge recommendations take into account each patient's current medical complexities and are subject to input/oversight from the patient's healthcare team.   Barriers to Home Discharge:   [x] Steps to access home entry or bed/bath:   [x] Unable to transfer, ambulate, or propel wheelchair household distances without assist   [x] Limited available assist at home upon discharge    [x] Patient or family requests d/c to post-acute facility    [x] Poor cognition/safety awareness for d/c to home alone   [] Unable to maintain ordered weight bearing status    [] Patient with salient signs of long-standing immobility   [x] Other: Decreased IND with ADLs      Patient Diagnosis(es): The primary encounter diagnosis was Septicemia (HCC). Diagnoses of Nausea and vomiting, unspecified vomiting type, Hepatic encephalopathy (HCC), and Thrombocytopenia were also pertinent to this visit.     Assessment   Assessment: Patient tolerated therapy session well. Patient progressing in areas of standing balance and activity tolerance. Patient no longer requires cotx with PT due to functional improvements in ambulation and transfers. Patient continues to require max cues for environmental navigation due to low vision. Patient requiring moderate cues during ADLs for initiation, sequencing, problem solving due to cognitive deficits. Patient currently requiring Total A to Max A with LB ADLs and up to Mod A with seated UB ADLs. Patient continues to demonstrate performance component deficits in balance, activity tolerance, strength, cognition, safety, vision. Patient remains below baseline level of occupational performance. Recommend continued OT services during acute care stay and anticipate  14  AM-PAC Inpatient ADL T-Scale Score : 33.39  ADL Inpatient CMS 0-100% Score: 59.67  ADL Inpatient CMS G-Code Modifier : CK    Therapy Time   Individual Concurrent Group Co-treatment   Time In 1124         Time Out 1202         Minutes 38         Timed Code Treatment Minutes: 38 Minutes     If pt is discharged prior to next OT session, this note will serve as the discharge summary.    Meggan Lewis OT

## 2025-06-04 NOTE — PLAN OF CARE
Problem: Safety - Adult  Goal: Free from fall injury  6/3/2025 2132 by Philipp Goldsmith, RN  Outcome: Progressing     Problem: Skin/Tissue Integrity  Goal: Skin integrity remains intact  Description: 1.  Monitor for areas of redness and/or skin breakdown2.  Assess vascular access sites hourly3.  Every 4-6 hours minimum:  Change oxygen saturation probe site4.  Every 4-6 hours:  If on nasal continuous positive airway pressure, respiratory therapy assess nares and determine need for appliance change or resting period  Outcome: Progressing     Problem: Pain  Goal: Verbalizes/displays adequate comfort level or baseline comfort level  6/3/2025 2132 by Philipp Goldsmith RN  Outcome: Progressing  Flowsheets (Taken 6/3/2025 2132)  Verbalizes/displays adequate comfort level or baseline comfort level:   Encourage patient to monitor pain and request assistance   Assess pain using appropriate pain scale   Implement non-pharmacological measures as appropriate and evaluate response   Administer analgesics based on type and severity of pain and evaluate response     Problem: Skin/Tissue Integrity - Adult  Goal: Skin integrity remains intact  Description: 1.  Monitor for areas of redness and/or skin breakdown2.  Assess vascular access sites hourly3.  Every 4-6 hours minimum:  Change oxygen saturation probe site4.  Every 4-6 hours:  If on nasal continuous positive airway pressure, respiratory therapy assess nares and determine need for appliance change or resting period  Outcome: Progressing

## 2025-06-04 NOTE — CARE COORDINATION
CASE MANAGEMENT DISCHARGE SUMMARY      Discharge to: skilled [facility]    Precertification completed: na  Hospital Exemption Notification (HENS) completed: when [facility] completed. [id number]     IMM given: 6/4/25    New Durable Medical Equipment ordered/agency: none    Transportation:    Family/car:   Medical Transport explained to pt/family. Pt/family voice no agency preference.    Agency used:[facility]    time:130   Ambulance form completed: Yes    Confirmed discharge plan with:     Patient: yes     Family:  yes  Cristo via phone     Facility/Agency, name:  [facility] faxed when completed.    Phone number for report to facility: [phone]     RN, name: Nasrin    Note: Discharging nurse to complete [facility], reconcile [facility], and place final copy with patient's discharge packet. RN to ensure that written prescriptions for  Level II medications are sent with patient to the facility as per protocol.    Harmeet Cleary RN

## 2025-06-04 NOTE — PLAN OF CARE
Problem: Safety - Adult  Goal: Free from fall injury  6/4/2025 1250 by Georgia Zabala, RN  Outcome: Completed     Problem: Skin/Tissue Integrity  Goal: Skin integrity remains intact  Description: 1.  Monitor for areas of redness and/or skin breakdown2.  Assess vascular access sites hourly3.  Every 4-6 hours minimum:  Change oxygen saturation probe site4.  Every 4-6 hours:  If on nasal continuous positive airway pressure, respiratory therapy assess nares and determine need for appliance change or resting period  Outcome: Completed     Problem: Pain  Goal: Verbalizes/displays adequate comfort level or baseline comfort level  6/4/2025 1250 by Georgia Zabala, RN  Outcome: Completed     Problem: Skin/Tissue Integrity - Adult  Goal: Skin integrity remains intact  Description: 1.  Monitor for areas of redness and/or skin breakdown2.  Assess vascular access sites hourly3.  Every 4-6 hours minimum:  Change oxygen saturation probe site4.  Every 4-6 hours:  If on nasal continuous positive airway pressure, respiratory therapy assess nares and determine need for appliance change or resting period  Outcome: Completed     Problem: Skin/Tissue Integrity - Adult  Goal: Incisions, wounds, or drain sites healing without S/S of infection  Outcome: Completed     Problem: Skin/Tissue Integrity - Adult  Goal: Oral mucous membranes remain intact  Outcome: Completed     Problem: Musculoskeletal - Adult  Goal: Return mobility to safest level of function  Outcome: Completed  Flowsheets (Taken 6/4/2025 3699)  Return Mobility to Safest Level of Function: Assess patient stability and activity tolerance for standing, transferring and ambulating with or without assistive devices     Problem: Gastrointestinal - Adult  Goal: Minimal or absence of nausea and vomiting  Outcome: Completed     Problem: Gastrointestinal - Adult  Goal: Maintains or returns to baseline bowel function  Outcome: Completed     Problem: Gastrointestinal - Adult  Goal:

## 2025-06-04 NOTE — PROGRESS NOTES
Pt assessment completed and charted. Pt A&O with intermittent confusion. VSS,RA.  IV site patent/infusing.   Medication given per MAR.      Safety Measures in place:   Bed alarm on.  Bed in lowest position and wheels locked.   Call light and bedside table within reach.   Non-skid socks in place.   Pt denies any other needs at this time.    Pt calls out appropriately.  Patient in stable condition when RN left room.

## 2025-06-04 NOTE — DISCHARGE INSTR - COC
Continuity of Care Form    Patient Name: Zandra Trammell   :  1950  MRN:  [id number]    Admit date:  2025  Discharge date:  25    Code Status Order: Limited   Advance Directives:     Admitting Physician:  Fernando Manuel MD  PCP: Tramaine Michael MD    Discharging Nurse: Georgia MCDOWELL  Discharging Hospital Unit/Room#: 0336/0336-01  Discharging Unit Phone Number: [phone]    Emergency Contact:   Extended Emergency Contact Information  Primary Emergency Contact: Jassi Trammell Dr  Address: [address] [place] [address] [place]  Work Phone: [phone]  Mobile Phone: [phone]  Relation: Spouse  Secondary Emergency Contact: Indra Trammell  Home Phone: [phone]  Relation: Step Child    Past Surgical History:  Past Surgical History:   Procedure Laterality Date    ESOPHAGOGASTRODUODENOSCOPY         Immunization History:   Immunization History   Administered Date(s) Administered    COVID-19, MODERNA Bivalent, (age 12y+), IM, 50 mcg/0.5 mL 2023    COVID-19, PFIZER, , (age 12y+), IM, 30mcg/0.3mL 2023, 2024       Active Problems:  Patient Active Problem List   Diagnosis Code    SOB (shortness of breath) R06.02       Isolation/Infection:   Isolation            No Isolation          Patient Infection Status    None to display              Nurse Assessment:  Last Vital Signs: /67   Pulse 72   Temp 97.7 °F (36.5 °C) (Oral)   Resp 18   Ht 1.6 m (5' 3\")   Wt 77 kg (169 lb 12.1 oz)   SpO2 92%   BMI 30.07 kg/m²     Last documented pain score (0-10 scale): Pain Level: 6  Last Weight:   Wt Readings from Last 1 Encounters:   25 77 kg (169 lb 12.1 oz)     Mental Status:  oriented and alert    IV Access:  - None    Nursing Mobility/ADLs:  Walking   Assisted  Transfer  Assisted  Bathing  Assisted  Dressing  Assisted  Toileting  Assisted  Feeding  Assisted  Med Admin  Assisted  Med Delivery   whole    Wound Care

## 2025-06-04 NOTE — DISCHARGE SUMMARY
Hospital Medicine Discharge Summary    Patient: Zandra Trammell   : 1950     Hospital:  [facility]  Admit Date: 2025   Discharge Date:   25  Disposition:  SNF - [facility]   Code status:  Limited (DNR/CCA with Do Not Intubate)  Condition at Discharge: Stable  Primary Care Provider: Tramaine Michael MD    Admitting Provider: Fernando Manuel MD  Discharge Provider: Ruth Chavez MD     Discharge Diagnoses:      Active Hospital Problems    Diagnosis     SOB (shortness of breath) [R06.02]        Presenting Admission History:         74 y.o. female who presented to [facility] with Nausea, vomiting, weakness, couldn't walk.  PMHx significant for Anxiety, cirrhosis, HTN. Patient seen at bedside on the morning of admission. Patient mentions ongoing nausea, vomiting, weakness and trouble walking ongoing for past day. Patient also mentions ongoing intermittent fevers with dry cough. Describes abdominal pain 5/10 in severity in middle of abdomen. Patient has some trouble with vision. No hematochezia, hematemesis, pain anywhere else, sob, hypoxic events. Patient at home was able to complete ADLs independently.      Assessment/Plan:      Expand All Collapse All            Hospital Medicine Progress Note  V 5.17                            Date of Admission: 2025    Hospital Day: 7       Chief Admission Complaint:  Vomiting (Per EMS report pt has been vomiting for the past couple of hours. She has a hx of liver disease with hepatic encephalopathy.  reporting pt was \"fine\" yesterday and had an appt with her \"sleep doctor\" aka neurologist. Patient is legally blind. )     Subjective:       Patient was evaluated at bedside this morning. Denies any acute concerns. She denies any vomiting or abdominal pain at this time.       Presenting Admission History:        74 y.o. female who presented to [facility] with Nausea, vomiting, weakness,

## 2025-06-04 NOTE — CARE COORDINATION
Chart reviewed day 7. Anticipating nearing d/c. Spoke with patient confirmed plan for skilled stay at [facility]. Patient c/o back ache would like to change positions maybe get up in chair. Done per Nasrin MCDOWELL. Harmeet Cleary RN

## 2025-06-13 NOTE — PROGRESS NOTES
Physician Progress Note      PATIENT:               PHILIP CHERRY  CSN #:                  [id number]  :                       1950  ADMIT DATE:       2025 6:21 AM  DISCH DATE:        2025 2:29 PM  RESPONDING  PROVIDER #:        RIK DECKER          QUERY TEXT:    Acute hypoxic respiratory failure is documented in the medical record D/C   Summary. Please provide additional clinical indicators supportive of your   documentation. Or please document if the diagnosis of acute respiratory   failure has been ruled out after study.    The clinical indicators include:  -\"Thorax & Lungs: Non labored at rest, no respiratory distress\"   (Gastroenterology CN )  -\"No apparent distress  Respiratory:  Normal respiratory effort without tachypnea.\" (IM PN )  -\"She became hypoxic last night and less responsive, there was concern for   aspiration and she was made NPO.  Lungs: non-labored, no respiratory distress\" (Gastroenterology )  -\"Acute respi failure with hypoxia:  Occurred overnight with hypoxia of 87 % and requiring 3-4 L per nasal cannula  possible aspiration  repeat CXR was obtained, now with new right basilar airspace disease possible   atelectasis  aspiration precautions, SLP was consulted and Ok for diet  pulm expansion maneuvers\" (IM PN )  -\"Seizure like activity: Recurrent spells of severe confusion, auditory   hallucinations, and thrashing in bed.\" (Neurology CN )  -\"Normal development and appear in no acute distress.\" (Neurology PN 6/3)  -\"Acute respiratory failure with hypoxia  Occurred overnight, concerns for possible aspiration  Chest x-ray showing right basilar airspace disease likely secondary to   atelectasis  SLP consulted, patient cleared for normal diet  Continue incentive spirometry\" (D/C Summary)    -Arterial pH = 7.483  pCO2 = 23.2  pO2 = 66.7  HCO3 = 17.0  SpO2 = 87  RR = 38, 45    -given 4L NC  Options provided:  -- Acute Hypoxic Respiratory Failure as evidenced

## 2025-07-06 NOTE — PROGRESS NOTES
Physician Progress Note      PATIENT:               PHILIP CHERRY  CSN #:                  [id number]  :                       1950  ADMIT DATE:       2025 6:21 AM  DISCH DATE:        2025 2:29 PM  RESPONDING  PROVIDER #:        Aquiles Adair DO          QUERY TEXT:    Sepsis is documented in the medical record D/C Summary. Please provide   additional clinical indicators supportive of your documentation. Or please   document if the diagnosis of sepsis has been ruled out after study.    The clinical indicators include:  -\"Sepsis (resolved)  On admission with fever, tachycardia, elevated lactic acid. Source concerning   for possible aspiration pneumonia versus gastric  Initially started on Vanco and ceftriaxone, however due to patient not having   any obvious source of infection antibiotics were discontinued  Lactic acid resolved \" (D/C summary)    -WBC = 5.4, 3.0, 2.8, 3.9  Lactic acid, sepsis = 2.7, 2.2  Lactic acid = 3.1, 3.3  Temp = 102.1    -Borderline pulmonary vascular congestion. (Chest xray )  -Suspected new right basilar airspace disease, possibly atelectasis. (Chest   xray )    -vancomycin and ceftriaxone  Options provided:  -- Sepsis present as evidenced by, Please document infectious source  -- Sepsis was ruled out after study  -- Other - I will add my own diagnosis  -- Disagree - Not applicable / Not valid  -- Disagree - Clinically unable to determine / Unknown  -- Refer to Clinical Documentation Reviewer    PROVIDER RESPONSE TEXT:    Sepsis was ruled out after study.    Query created by: Alondra Castillo on 2025 3:58 AM      Electronically signed by:  Aquiles Adair DO 2025 9:59 AM

## 2025-07-16 NOTE — PROGRESS NOTES
Physician Progress Note      PATIENT:               PHILIP CHERRY  [facility] #:                  [id number]  :                       1950  ADMIT DATE:       2025 6:21 AM  DISCH DATE:        2025 2:29 PM  RESPONDING  PROVIDER #:        Aquiles Adair DO          QUERY TEXT:    Encephalopathy is documented in the medical record D/C Summary.  Please   specify type:    The clinical indicators include:  -\"AMS.  Likely large component of HE - she missed several days of lactulose and   rifaximin.  There may be an infectious component as well.\" (Gastroenterology CN )    -\"Acute Encephalopathy, Altered Mental status  likely some component of Hepatic Encephalopathy confounded with acute illness\"   (IM PN )    -\"Encephalopathy - acute metabolic, secondary to sepsis.  Will continue to   follow clinical response w/ supportive care PRN.  Neurology consulted, recommended EEG pending interpretation, follow-up   outpatient neurology 3 months  Continue to hold Wellbutrin given AMS\" (IM PN 6/3)    -\"Seizure like activity-- Recurrent spells of severe confusion, auditory   hallucinations, thrashing in bed. Ddx toxic metabolic encephalopathy   (hyperammonemia, febrile illness, cefepime exposure), less likely seizures.   EEG normal.\" (Neuro PN 6/3)    -\"She has a hx of liver disease with hepatic encephalopathy.  reporting   pt was \"fine\" yesterday and had an appt with her \"sleep doctor\" aka   neurologist.  A/P:  Acute encephalopathy  Altered mental status  Unlikely related to hepatic encephalopathy per GI  CT head negative  MRI without any acute findings  Ammonia levels WNL  Tox screen negative  Neurology has been consulted, patient to follow-up outpatient  Hold Wellbutrin and Compazine per neurology  Continue lactulose and Xifaxan per GI  EEG completed , no concern per Neurology\" (DC Summary)    -\"Sepsis was ruled out after study.\" (Query Response )    -1. No acute intracranial abnormality. No evidence of

## 2025-08-06 ENCOUNTER — TELEPHONE (OUTPATIENT)
Dept: NEUROLOGY | Age: 75
End: 2025-08-06

## 2025-08-06 DIAGNOSIS — E55.9 VITAMIN D DEFICIENCY: Primary | ICD-10-CM

## 2025-08-07 RX ORDER — ERGOCALCIFEROL 1.25 MG/1
50000 CAPSULE, LIQUID FILLED ORAL WEEKLY
Qty: 12 CAPSULE | Refills: 0 | Status: SHIPPED | OUTPATIENT
Start: 2025-08-07 | End: 2025-10-24